# Patient Record
Sex: FEMALE | Race: WHITE | NOT HISPANIC OR LATINO | URBAN - METROPOLITAN AREA
[De-identification: names, ages, dates, MRNs, and addresses within clinical notes are randomized per-mention and may not be internally consistent; named-entity substitution may affect disease eponyms.]

---

## 2017-01-30 ENCOUNTER — OFFICE (AMBULATORY)
Dept: URBAN - METROPOLITAN AREA CLINIC 64 | Facility: CLINIC | Age: 57
End: 2017-01-30

## 2017-01-30 VITALS
WEIGHT: 129 LBS | DIASTOLIC BLOOD PRESSURE: 79 MMHG | SYSTOLIC BLOOD PRESSURE: 143 MMHG | HEART RATE: 82 BPM | HEIGHT: 62 IN

## 2017-01-30 DIAGNOSIS — K59.00 CONSTIPATION, UNSPECIFIED: ICD-10-CM

## 2017-01-30 DIAGNOSIS — K21.9 GASTRO-ESOPHAGEAL REFLUX DISEASE WITHOUT ESOPHAGITIS: ICD-10-CM

## 2017-01-30 DIAGNOSIS — R94.5 ABNORMAL RESULTS OF LIVER FUNCTION STUDIES: ICD-10-CM

## 2017-01-30 PROCEDURE — 99243 OFF/OP CNSLTJ NEW/EST LOW 30: CPT | Performed by: INTERNAL MEDICINE

## 2017-01-30 RX ORDER — PANTOPRAZOLE SODIUM 40 MG/1
40 TABLET, DELAYED RELEASE ORAL
Qty: 90 | Refills: 3 | Status: COMPLETED
Start: 2017-01-30 | End: 2020-10-01

## 2017-01-30 RX ORDER — LINACLOTIDE 290 UG/1
290 CAPSULE, GELATIN COATED ORAL
Qty: 90 | Refills: 3 | Status: COMPLETED
Start: 2017-01-30 | End: 2017-03-13

## 2017-03-13 ENCOUNTER — OFFICE (AMBULATORY)
Dept: URBAN - METROPOLITAN AREA CLINIC 64 | Facility: CLINIC | Age: 57
End: 2017-03-13

## 2017-03-13 VITALS
DIASTOLIC BLOOD PRESSURE: 81 MMHG | SYSTOLIC BLOOD PRESSURE: 142 MMHG | WEIGHT: 132 LBS | HEIGHT: 62 IN | HEART RATE: 104 BPM

## 2017-03-13 DIAGNOSIS — R94.5 ABNORMAL RESULTS OF LIVER FUNCTION STUDIES: ICD-10-CM

## 2017-03-13 DIAGNOSIS — R11.0 NAUSEA: ICD-10-CM

## 2017-03-13 DIAGNOSIS — K59.00 CONSTIPATION, UNSPECIFIED: ICD-10-CM

## 2017-03-13 DIAGNOSIS — K21.9 GASTRO-ESOPHAGEAL REFLUX DISEASE WITHOUT ESOPHAGITIS: ICD-10-CM

## 2017-03-13 LAB
AMYLASE, SERUM: 42 U/L (ref 31–124)
COMP. METABOLIC PANEL (14): A/G RATIO: 1.6 (ref 1.2–2.2)
COMP. METABOLIC PANEL (14): ALBUMIN, SERUM: 4.8 G/DL (ref 3.5–5.5)
COMP. METABOLIC PANEL (14): ALKALINE PHOSPHATASE, S: 137 IU/L — HIGH (ref 39–117)
COMP. METABOLIC PANEL (14): ALT (SGPT): 97 IU/L — HIGH (ref 0–32)
COMP. METABOLIC PANEL (14): AST (SGOT): 133 IU/L — HIGH (ref 0–40)
COMP. METABOLIC PANEL (14): BILIRUBIN, TOTAL: 0.5 MG/DL (ref 0–1.2)
COMP. METABOLIC PANEL (14): BUN/CREATININE RATIO: 11 (ref 9–23)
COMP. METABOLIC PANEL (14): BUN: 8 MG/DL (ref 6–24)
COMP. METABOLIC PANEL (14): CALCIUM, SERUM: 9.8 MG/DL (ref 8.7–10.2)
COMP. METABOLIC PANEL (14): CARBON DIOXIDE, TOTAL: 19 MMOL/L (ref 18–29)
COMP. METABOLIC PANEL (14): CHLORIDE, SERUM: 100 MMOL/L (ref 96–106)
COMP. METABOLIC PANEL (14): CREATININE, SERUM: 0.76 MG/DL (ref 0.57–1)
COMP. METABOLIC PANEL (14): EGFR IF AFRICN AM: 101 ML/MIN/1.73 (ref 59–?)
COMP. METABOLIC PANEL (14): EGFR IF NONAFRICN AM: 87 ML/MIN/1.73 (ref 59–?)
COMP. METABOLIC PANEL (14): GLOBULIN, TOTAL: 3 G/DL (ref 1.5–4.5)
COMP. METABOLIC PANEL (14): GLUCOSE, SERUM: 132 MG/DL — HIGH (ref 65–99)
COMP. METABOLIC PANEL (14): POTASSIUM, SERUM: 4.2 MMOL/L (ref 3.5–5.2)
COMP. METABOLIC PANEL (14): PROTEIN, TOTAL, SERUM: 7.8 G/DL (ref 6–8.5)
COMP. METABOLIC PANEL (14): SODIUM, SERUM: 140 MMOL/L (ref 134–144)
FERRITIN, SERUM: 539 NG/ML — HIGH (ref 15–150)
HERED.HEMOCHROMATOSIS, DNA: HEREDITARY  HEMOCHROMATOSIS: (no result)
IRON AND TIBC: IRON BIND.CAP.(TIBC): 290 UG/DL (ref 250–450)
IRON AND TIBC: IRON SATURATION: 29 % (ref 15–55)
IRON AND TIBC: IRON, SERUM: 85 UG/DL (ref 27–159)
IRON AND TIBC: UIBC: 205 UG/DL (ref 131–425)
LIPASE, SERUM: 38 U/L (ref 0–59)
TSH: 3.01 UIU/ML (ref 0.45–4.5)

## 2017-03-13 PROCEDURE — 99214 OFFICE O/P EST MOD 30 MIN: CPT | Performed by: INTERNAL MEDICINE

## 2017-03-13 RX ORDER — SUCRALFATE 1 G/1
TABLET ORAL
Qty: 90 | Refills: 3 | Status: COMPLETED
Start: 2017-03-13 | End: 2017-09-11

## 2017-03-13 RX ORDER — METOCLOPRAMIDE HYDROCHLORIDE 5 MG/1
10 TABLET ORAL
Qty: 60 | Refills: 1 | Status: COMPLETED
Start: 2017-03-13 | End: 2017-09-11

## 2017-03-17 ENCOUNTER — ON CAMPUS - OUTPATIENT (AMBULATORY)
Dept: URBAN - METROPOLITAN AREA HOSPITAL 2 | Facility: HOSPITAL | Age: 57
End: 2017-03-17

## 2017-03-17 ENCOUNTER — OFFICE (AMBULATORY)
Dept: URBAN - METROPOLITAN AREA CLINIC 64 | Facility: CLINIC | Age: 57
End: 2017-03-17
Payer: COMMERCIAL

## 2017-03-17 VITALS
OXYGEN SATURATION: 98 % | SYSTOLIC BLOOD PRESSURE: 145 MMHG | HEIGHT: 62 IN | HEART RATE: 83 BPM | HEART RATE: 79 BPM | DIASTOLIC BLOOD PRESSURE: 88 MMHG | DIASTOLIC BLOOD PRESSURE: 76 MMHG | SYSTOLIC BLOOD PRESSURE: 150 MMHG | RESPIRATION RATE: 16 BRPM | OXYGEN SATURATION: 99 % | HEART RATE: 95 BPM | SYSTOLIC BLOOD PRESSURE: 132 MMHG | SYSTOLIC BLOOD PRESSURE: 142 MMHG | RESPIRATION RATE: 18 BRPM | OXYGEN SATURATION: 97 % | DIASTOLIC BLOOD PRESSURE: 67 MMHG | DIASTOLIC BLOOD PRESSURE: 75 MMHG | SYSTOLIC BLOOD PRESSURE: 137 MMHG | DIASTOLIC BLOOD PRESSURE: 78 MMHG | HEART RATE: 93 BPM | HEART RATE: 76 BPM | SYSTOLIC BLOOD PRESSURE: 103 MMHG | DIASTOLIC BLOOD PRESSURE: 85 MMHG | SYSTOLIC BLOOD PRESSURE: 122 MMHG | OXYGEN SATURATION: 96 % | SYSTOLIC BLOOD PRESSURE: 136 MMHG | OXYGEN SATURATION: 94 % | WEIGHT: 126 LBS | DIASTOLIC BLOOD PRESSURE: 73 MMHG | TEMPERATURE: 97.4 F | SYSTOLIC BLOOD PRESSURE: 123 MMHG | HEART RATE: 85 BPM | HEART RATE: 84 BPM

## 2017-03-17 DIAGNOSIS — R11.0 NAUSEA: ICD-10-CM

## 2017-03-17 DIAGNOSIS — D12.0 BENIGN NEOPLASM OF CECUM: ICD-10-CM

## 2017-03-17 DIAGNOSIS — K31.9 DISEASE OF STOMACH AND DUODENUM, UNSPECIFIED: ICD-10-CM

## 2017-03-17 DIAGNOSIS — K59.00 CONSTIPATION, UNSPECIFIED: ICD-10-CM

## 2017-03-17 DIAGNOSIS — R19.4 CHANGE IN BOWEL HABIT: ICD-10-CM

## 2017-03-17 DIAGNOSIS — K64.0 FIRST DEGREE HEMORRHOIDS: ICD-10-CM

## 2017-03-17 DIAGNOSIS — K57.30 DIVERTICULOSIS OF LARGE INTESTINE WITHOUT PERFORATION OR ABS: ICD-10-CM

## 2017-03-17 PROBLEM — K31.89 OTHER DISEASES OF STOMACH AND DUODENUM: Status: ACTIVE | Noted: 2017-03-17

## 2017-03-17 PROBLEM — K29.70 GASTRITIS, UNSPECIFIED, WITHOUT BLEEDING: Status: ACTIVE | Noted: 2017-03-17

## 2017-03-17 LAB
GI HISTOLOGY: A. SELECT: (no result)
GI HISTOLOGY: B. UNSPECIFIED: (no result)
GI HISTOLOGY: PDF REPORT: (no result)

## 2017-03-17 PROCEDURE — 88305 TISSUE EXAM BY PATHOLOGIST: CPT | Performed by: INTERNAL MEDICINE

## 2017-03-17 PROCEDURE — 45380 COLONOSCOPY AND BIOPSY: CPT | Performed by: INTERNAL MEDICINE

## 2017-03-17 PROCEDURE — 43239 EGD BIOPSY SINGLE/MULTIPLE: CPT | Performed by: INTERNAL MEDICINE

## 2017-03-17 RX ORDER — SUCRALFATE 1 G/1
TABLET ORAL
Qty: 90 | Refills: 3 | Status: COMPLETED
Start: 2017-03-13 | End: 2017-09-11

## 2017-03-17 RX ADMIN — PROPOFOL: 10 INJECTION, EMULSION INTRAVENOUS at 16:08

## 2017-04-05 ENCOUNTER — OFFICE (AMBULATORY)
Dept: URBAN - METROPOLITAN AREA CLINIC 64 | Facility: CLINIC | Age: 57
End: 2017-04-05

## 2017-04-05 VITALS
HEART RATE: 91 BPM | SYSTOLIC BLOOD PRESSURE: 152 MMHG | DIASTOLIC BLOOD PRESSURE: 96 MMHG | HEIGHT: 62 IN | WEIGHT: 134 LBS

## 2017-04-05 DIAGNOSIS — K76.0 FATTY (CHANGE OF) LIVER, NOT ELSEWHERE CLASSIFIED: ICD-10-CM

## 2017-04-05 DIAGNOSIS — K59.00 CONSTIPATION, UNSPECIFIED: ICD-10-CM

## 2017-04-05 DIAGNOSIS — R94.5 ABNORMAL RESULTS OF LIVER FUNCTION STUDIES: ICD-10-CM

## 2017-04-05 DIAGNOSIS — R11.0 NAUSEA: ICD-10-CM

## 2017-04-05 PROCEDURE — 99213 OFFICE O/P EST LOW 20 MIN: CPT | Performed by: INTERNAL MEDICINE

## 2017-05-16 ENCOUNTER — OFFICE (AMBULATORY)
Dept: URBAN - METROPOLITAN AREA CLINIC 64 | Facility: CLINIC | Age: 57
End: 2017-05-16

## 2017-05-16 VITALS
DIASTOLIC BLOOD PRESSURE: 67 MMHG | SYSTOLIC BLOOD PRESSURE: 114 MMHG | HEIGHT: 62 IN | HEART RATE: 77 BPM | WEIGHT: 128 LBS

## 2017-05-16 DIAGNOSIS — K59.00 CONSTIPATION, UNSPECIFIED: ICD-10-CM

## 2017-05-16 DIAGNOSIS — K76.0 FATTY (CHANGE OF) LIVER, NOT ELSEWHERE CLASSIFIED: ICD-10-CM

## 2017-05-16 DIAGNOSIS — R11.0 NAUSEA: ICD-10-CM

## 2017-05-16 PROCEDURE — 99214 OFFICE O/P EST MOD 30 MIN: CPT | Performed by: INTERNAL MEDICINE

## 2017-05-16 RX ORDER — URSODIOL 500 MG/1
1000 TABLET ORAL
Qty: 60 | Refills: 11 | Status: COMPLETED
Start: 2017-05-16 | End: 2017-09-11

## 2017-09-11 ENCOUNTER — OFFICE (AMBULATORY)
Dept: URBAN - METROPOLITAN AREA CLINIC 64 | Facility: CLINIC | Age: 57
End: 2017-09-11

## 2017-09-11 VITALS
HEIGHT: 62 IN | WEIGHT: 127 LBS | HEART RATE: 75 BPM | DIASTOLIC BLOOD PRESSURE: 75 MMHG | SYSTOLIC BLOOD PRESSURE: 120 MMHG

## 2017-09-11 DIAGNOSIS — R11.0 NAUSEA: ICD-10-CM

## 2017-09-11 DIAGNOSIS — K59.00 CONSTIPATION, UNSPECIFIED: ICD-10-CM

## 2017-09-11 DIAGNOSIS — K76.0 FATTY (CHANGE OF) LIVER, NOT ELSEWHERE CLASSIFIED: ICD-10-CM

## 2017-09-11 LAB
AFP, SERUM, TUMOR MARKER: 6.1 NG/ML (ref 0–8.3)
CBC WITH DIFFERENTIAL/PLATELET: BASO (ABSOLUTE): 0.1 X10E3/UL (ref 0–0.2)
CBC WITH DIFFERENTIAL/PLATELET: BASOS: 1 %
CBC WITH DIFFERENTIAL/PLATELET: EOS (ABSOLUTE): 0.2 X10E3/UL (ref 0–0.4)
CBC WITH DIFFERENTIAL/PLATELET: EOS: 3 %
CBC WITH DIFFERENTIAL/PLATELET: HEMATOCRIT: 45.6 % (ref 34–46.6)
CBC WITH DIFFERENTIAL/PLATELET: HEMATOLOGY COMMENTS: (no result)
CBC WITH DIFFERENTIAL/PLATELET: HEMOGLOBIN: 15.9 G/DL (ref 11.1–15.9)
CBC WITH DIFFERENTIAL/PLATELET: IMMATURE CELLS: (no result)
CBC WITH DIFFERENTIAL/PLATELET: IMMATURE GRANS (ABS): 0 X10E3/UL (ref 0–0.1)
CBC WITH DIFFERENTIAL/PLATELET: IMMATURE GRANULOCYTES: 0 %
CBC WITH DIFFERENTIAL/PLATELET: LYMPHS (ABSOLUTE): 1.8 X10E3/UL (ref 0.7–3.1)
CBC WITH DIFFERENTIAL/PLATELET: LYMPHS: 28 %
CBC WITH DIFFERENTIAL/PLATELET: MCH: 31.8 PG (ref 26.6–33)
CBC WITH DIFFERENTIAL/PLATELET: MCHC: 34.9 G/DL (ref 31.5–35.7)
CBC WITH DIFFERENTIAL/PLATELET: MCV: 91 FL (ref 79–97)
CBC WITH DIFFERENTIAL/PLATELET: MONOCYTES(ABSOLUTE): 0.5 X10E3/UL (ref 0.1–0.9)
CBC WITH DIFFERENTIAL/PLATELET: MONOCYTES: 7 %
CBC WITH DIFFERENTIAL/PLATELET: NEUTROPHILS (ABSOLUTE): 3.9 X10E3/UL (ref 1.4–7)
CBC WITH DIFFERENTIAL/PLATELET: NEUTROPHILS: 61 %
CBC WITH DIFFERENTIAL/PLATELET: NRBC: (no result)
CBC WITH DIFFERENTIAL/PLATELET: PLATELETS: 158 X10E3/UL (ref 150–379)
CBC WITH DIFFERENTIAL/PLATELET: RBC: 5 X10E6/UL (ref 3.77–5.28)
CBC WITH DIFFERENTIAL/PLATELET: RDW: 12.8 % (ref 12.3–15.4)
CBC WITH DIFFERENTIAL/PLATELET: WBC: 6.5 X10E3/UL (ref 3.4–10.8)
COMP. METABOLIC PANEL (14): A/G RATIO: 1.5 (ref 1.2–2.2)
COMP. METABOLIC PANEL (14): ALBUMIN, SERUM: 4.7 G/DL (ref 3.5–5.5)
COMP. METABOLIC PANEL (14): ALKALINE PHOSPHATASE, S: 155 IU/L — HIGH (ref 39–117)
COMP. METABOLIC PANEL (14): ALT (SGPT): 70 IU/L — HIGH (ref 0–32)
COMP. METABOLIC PANEL (14): AST (SGOT): 82 IU/L — HIGH (ref 0–40)
COMP. METABOLIC PANEL (14): BILIRUBIN, TOTAL: 0.6 MG/DL (ref 0–1.2)
COMP. METABOLIC PANEL (14): BUN/CREATININE RATIO: 13 (ref 9–23)
COMP. METABOLIC PANEL (14): BUN: 8 MG/DL (ref 6–24)
COMP. METABOLIC PANEL (14): CALCIUM, SERUM: 9.6 MG/DL (ref 8.7–10.2)
COMP. METABOLIC PANEL (14): CARBON DIOXIDE, TOTAL: 21 MMOL/L (ref 18–29)
COMP. METABOLIC PANEL (14): CHLORIDE, SERUM: 96 MMOL/L (ref 96–106)
COMP. METABOLIC PANEL (14): CREATININE, SERUM: 0.62 MG/DL (ref 0.57–1)
COMP. METABOLIC PANEL (14): EGFR IF AFRICN AM: 115 ML/MIN/1.73 (ref 59–?)
COMP. METABOLIC PANEL (14): EGFR IF NONAFRICN AM: 100 ML/MIN/1.73 (ref 59–?)
COMP. METABOLIC PANEL (14): GLOBULIN, TOTAL: 3.2 G/DL (ref 1.5–4.5)
COMP. METABOLIC PANEL (14): GLUCOSE, SERUM: 95 MG/DL (ref 65–99)
COMP. METABOLIC PANEL (14): POTASSIUM, SERUM: 4 MMOL/L (ref 3.5–5.2)
COMP. METABOLIC PANEL (14): PROTEIN, TOTAL, SERUM: 7.9 G/DL (ref 6–8.5)
COMP. METABOLIC PANEL (14): SODIUM, SERUM: 137 MMOL/L (ref 134–144)
FERRITIN, SERUM: 402 NG/ML — HIGH (ref 15–150)

## 2017-09-11 PROCEDURE — 99214 OFFICE O/P EST MOD 30 MIN: CPT | Performed by: INTERNAL MEDICINE

## 2018-08-21 ENCOUNTER — OFFICE (AMBULATORY)
Dept: URBAN - METROPOLITAN AREA CLINIC 64 | Facility: CLINIC | Age: 58
End: 2018-08-21

## 2018-08-21 VITALS
HEIGHT: 62 IN | SYSTOLIC BLOOD PRESSURE: 148 MMHG | DIASTOLIC BLOOD PRESSURE: 77 MMHG | HEART RATE: 88 BPM | WEIGHT: 126 LBS

## 2018-08-21 DIAGNOSIS — R11.0 NAUSEA: ICD-10-CM

## 2018-08-21 DIAGNOSIS — K59.00 CONSTIPATION, UNSPECIFIED: ICD-10-CM

## 2018-08-21 DIAGNOSIS — R94.5 ABNORMAL RESULTS OF LIVER FUNCTION STUDIES: ICD-10-CM

## 2018-08-21 PROCEDURE — 99213 OFFICE O/P EST LOW 20 MIN: CPT | Performed by: INTERNAL MEDICINE

## 2020-10-01 ENCOUNTER — OFFICE (AMBULATORY)
Dept: URBAN - METROPOLITAN AREA CLINIC 64 | Facility: CLINIC | Age: 60
End: 2020-10-01

## 2020-10-01 VITALS
WEIGHT: 123 LBS | HEART RATE: 79 BPM | DIASTOLIC BLOOD PRESSURE: 88 MMHG | SYSTOLIC BLOOD PRESSURE: 170 MMHG | HEIGHT: 62 IN

## 2020-10-01 DIAGNOSIS — R11.0 NAUSEA: ICD-10-CM

## 2020-10-01 DIAGNOSIS — K75.81 NONALCOHOLIC STEATOHEPATITIS (NASH): ICD-10-CM

## 2020-10-01 DIAGNOSIS — K59.00 CONSTIPATION, UNSPECIFIED: ICD-10-CM

## 2020-10-01 PROCEDURE — 99214 OFFICE O/P EST MOD 30 MIN: CPT | Performed by: INTERNAL MEDICINE

## 2020-10-01 RX ORDER — NYSTATIN 100000 [USP'U]/ML
SUSPENSION ORAL
Qty: 600 | Refills: 1 | Status: COMPLETED
Start: 2020-10-01 | End: 2022-01-26

## 2020-10-01 RX ORDER — OMEPRAZOLE 20 MG/1
CAPSULE, DELAYED RELEASE ORAL
Qty: 0 | Refills: 0 | Status: COMPLETED
End: 2022-01-26

## 2021-06-18 ENCOUNTER — TRANSCRIBE ORDERS (OUTPATIENT)
Dept: PULMONOLOGY | Facility: HOSPITAL | Age: 61
End: 2021-06-18

## 2021-06-18 DIAGNOSIS — Z13.6 ENCOUNTER FOR SCREENING FOR VASCULAR DISEASE: Primary | ICD-10-CM

## 2021-06-25 ENCOUNTER — HOSPITAL ENCOUNTER (OUTPATIENT)
Dept: CARDIOLOGY | Facility: HOSPITAL | Age: 61
Discharge: HOME | End: 2021-06-25

## 2021-06-25 ENCOUNTER — HOSPITAL ENCOUNTER (OUTPATIENT)
Dept: CT IMAGING | Facility: HOSPITAL | Age: 61
Discharge: HOME | End: 2021-06-25

## 2021-06-25 DIAGNOSIS — Z13.6 ENCOUNTER FOR SCREENING FOR VASCULAR DISEASE: ICD-10-CM

## 2021-06-25 PROCEDURE — 93799 UNLISTED CV SVC/PROCEDURE: CPT

## 2021-06-25 PROCEDURE — 75571 CT HRT W/O DYE W/CA TEST: CPT

## 2021-06-26 LAB
BH CV XLRA MEAS - PAD LEFT ABI PT: 1.06
BH CV XLRA MEAS - PAD LEFT ARM: 156 MMHG
BH CV XLRA MEAS - PAD LEFT LEG PT: 165 MMHG
BH CV XLRA MEAS - PAD RIGHT ABI PT: 1.04
BH CV XLRA MEAS - PAD RIGHT LEG PT: 163 MMHG
BH CV XLRA MEAS - PROX AO DIAM: 1.9 CM
BH CV XLRA MEAS LEFT DIST CCA PSV: 89.4 CM/SEC
BH CV XLRA MEAS LEFT ICA/CCA RATIO: 1.6
BH CV XLRA MEAS LEFT MID CCA PSV: 89 CM/SEC
BH CV XLRA MEAS LEFT MID ICA PSV: 140 CM/SEC
BH CV XLRA MEAS LEFT PROX ICA PSV: -143 CM/SEC
BH CV XLRA MEAS RIGHT DIST CCA PSV: 79.5 CM/SEC
BH CV XLRA MEAS RIGHT ICA/CCA RATIO: 1.9
BH CV XLRA MEAS RIGHT MID CCA PSV: 80 CM/SEC
BH CV XLRA MEAS RIGHT MID ICA PSV: 149 CM/SEC
BH CV XLRA MEAS RIGHT PROX ICA PSV: -149 CM/SEC
MAXIMAL PREDICTED HEART RATE: 159 BPM
STRESS TARGET HR: 135 BPM

## 2022-01-26 ENCOUNTER — OFFICE (AMBULATORY)
Dept: URBAN - METROPOLITAN AREA CLINIC 64 | Facility: CLINIC | Age: 62
End: 2022-01-26

## 2022-01-26 VITALS
WEIGHT: 124 LBS | DIASTOLIC BLOOD PRESSURE: 87 MMHG | HEIGHT: 62 IN | SYSTOLIC BLOOD PRESSURE: 169 MMHG | HEART RATE: 85 BPM

## 2022-01-26 DIAGNOSIS — K75.81 NONALCOHOLIC STEATOHEPATITIS (NASH): ICD-10-CM

## 2022-01-26 DIAGNOSIS — R10.9 UNSPECIFIED ABDOMINAL PAIN: ICD-10-CM

## 2022-01-26 PROBLEM — R11.0 NAUSEA: Status: ACTIVE | Noted: 2017-03-17

## 2022-01-26 PROCEDURE — 99214 OFFICE O/P EST MOD 30 MIN: CPT | Performed by: INTERNAL MEDICINE

## 2022-02-25 ENCOUNTER — OFFICE (AMBULATORY)
Dept: URBAN - METROPOLITAN AREA PATHOLOGY 4 | Facility: PATHOLOGY | Age: 62
End: 2022-02-25

## 2022-02-25 ENCOUNTER — ON CAMPUS - OUTPATIENT (AMBULATORY)
Dept: URBAN - METROPOLITAN AREA HOSPITAL 2 | Facility: HOSPITAL | Age: 62
End: 2022-02-25

## 2022-02-25 VITALS
DIASTOLIC BLOOD PRESSURE: 63 MMHG | DIASTOLIC BLOOD PRESSURE: 91 MMHG | HEART RATE: 78 BPM | DIASTOLIC BLOOD PRESSURE: 74 MMHG | TEMPERATURE: 97.5 F | DIASTOLIC BLOOD PRESSURE: 78 MMHG | OXYGEN SATURATION: 98 % | SYSTOLIC BLOOD PRESSURE: 130 MMHG | DIASTOLIC BLOOD PRESSURE: 92 MMHG | OXYGEN SATURATION: 99 % | RESPIRATION RATE: 16 BRPM | HEART RATE: 85 BPM | HEART RATE: 80 BPM | DIASTOLIC BLOOD PRESSURE: 75 MMHG | SYSTOLIC BLOOD PRESSURE: 148 MMHG | SYSTOLIC BLOOD PRESSURE: 170 MMHG | DIASTOLIC BLOOD PRESSURE: 81 MMHG | SYSTOLIC BLOOD PRESSURE: 146 MMHG | OXYGEN SATURATION: 95 % | SYSTOLIC BLOOD PRESSURE: 163 MMHG | WEIGHT: 124 LBS | RESPIRATION RATE: 18 BRPM | SYSTOLIC BLOOD PRESSURE: 153 MMHG | DIASTOLIC BLOOD PRESSURE: 72 MMHG | SYSTOLIC BLOOD PRESSURE: 131 MMHG | SYSTOLIC BLOOD PRESSURE: 135 MMHG | DIASTOLIC BLOOD PRESSURE: 93 MMHG | OXYGEN SATURATION: 100 % | HEIGHT: 62 IN | HEART RATE: 79 BPM

## 2022-02-25 DIAGNOSIS — K31.89 OTHER DISEASES OF STOMACH AND DUODENUM: ICD-10-CM

## 2022-02-25 DIAGNOSIS — K62.1 RECTAL POLYP: ICD-10-CM

## 2022-02-25 DIAGNOSIS — K64.1 SECOND DEGREE HEMORRHOIDS: ICD-10-CM

## 2022-02-25 DIAGNOSIS — R19.4 CHANGE IN BOWEL HABIT: ICD-10-CM

## 2022-02-25 DIAGNOSIS — K21.9 GASTRO-ESOPHAGEAL REFLUX DISEASE WITHOUT ESOPHAGITIS: ICD-10-CM

## 2022-02-25 PROCEDURE — 43239 EGD BIOPSY SINGLE/MULTIPLE: CPT | Performed by: INTERNAL MEDICINE

## 2022-02-25 PROCEDURE — 88305 TISSUE EXAM BY PATHOLOGIST: CPT | Performed by: INTERNAL MEDICINE

## 2022-02-25 PROCEDURE — 45380 COLONOSCOPY AND BIOPSY: CPT | Performed by: INTERNAL MEDICINE

## 2022-04-15 ENCOUNTER — OFFICE VISIT (OUTPATIENT)
Dept: FAMILY MEDICINE CLINIC | Facility: CLINIC | Age: 62
End: 2022-04-15

## 2022-04-15 VITALS
SYSTOLIC BLOOD PRESSURE: 174 MMHG | HEIGHT: 62 IN | BODY MASS INDEX: 22.93 KG/M2 | OXYGEN SATURATION: 98 % | HEART RATE: 95 BPM | RESPIRATION RATE: 14 BRPM | TEMPERATURE: 98 F | WEIGHT: 124.6 LBS | DIASTOLIC BLOOD PRESSURE: 80 MMHG

## 2022-04-15 DIAGNOSIS — Z86.19 H/O LYME DISEASE: ICD-10-CM

## 2022-04-15 DIAGNOSIS — H53.9 VISUAL CHANGES: ICD-10-CM

## 2022-04-15 DIAGNOSIS — Z76.89 ENCOUNTER TO ESTABLISH CARE WITH NEW DOCTOR: Primary | ICD-10-CM

## 2022-04-15 DIAGNOSIS — I10 ESSENTIAL HYPERTENSION: ICD-10-CM

## 2022-04-15 DIAGNOSIS — F41.9 ANXIETY: ICD-10-CM

## 2022-04-15 DIAGNOSIS — Z13.220 SCREENING FOR CHOLESTEROL LEVEL: ICD-10-CM

## 2022-04-15 DIAGNOSIS — R04.0 EPISTAXIS: ICD-10-CM

## 2022-04-15 DIAGNOSIS — A49.02 MRSA (METHICILLIN RESISTANT STAPHYLOCOCCUS AUREUS) INFECTION: ICD-10-CM

## 2022-04-15 DIAGNOSIS — Z85.3 HISTORY OF BREAST CANCER IN FEMALE: ICD-10-CM

## 2022-04-15 PROBLEM — D69.6 THROMBOCYTOPENIA (HCC): Status: ACTIVE | Noted: 2021-08-24

## 2022-04-15 PROBLEM — M35.00 SJOGREN'S DISEASE: Status: ACTIVE | Noted: 2021-08-25

## 2022-04-15 PROBLEM — K74.60 CIRRHOSIS (HCC): Status: ACTIVE | Noted: 2021-08-25

## 2022-04-15 PROBLEM — K75.81 NASH (NONALCOHOLIC STEATOHEPATITIS): Status: ACTIVE | Noted: 2021-08-25

## 2022-04-15 PROBLEM — D69.6 THROMBOCYTOPENIA: Status: RESOLVED | Noted: 2021-08-24 | Resolved: 2022-04-15

## 2022-04-15 PROBLEM — K75.81 NASH (NONALCOHOLIC STEATOHEPATITIS): Status: RESOLVED | Noted: 2021-08-25 | Resolved: 2022-04-15

## 2022-04-15 PROCEDURE — 99214 OFFICE O/P EST MOD 30 MIN: CPT | Performed by: FAMILY MEDICINE

## 2022-04-15 RX ORDER — CLOBETASOL PROPIONATE 0.5 MG/G
CREAM TOPICAL
COMMUNITY
Start: 2022-03-10

## 2022-04-15 RX ORDER — LISINOPRIL 10 MG/1
10 TABLET ORAL DAILY
Qty: 30 TABLET | Refills: 0 | Status: SHIPPED | OUTPATIENT
Start: 2022-04-15 | End: 2022-05-05

## 2022-04-15 NOTE — PROGRESS NOTES
Chief Complaint  Establish Care, Hypertension, Anxiety, Nose Bleed, and Headache  History of Present Illness  Meeta Daley is here to establish care.  She reports multiple concerns including visual changes, nosebleeds, migraines, anxiety, history of Lyme's disease.    Patient has been having problems with her blood pressure and believes that it has been affecting her eye sight, causing nose bleeds and migraines. Patient is not taking any blood pressure medicine at this time.  She brings in home blood pressure readings over the past week she has been checking at least twice on most days 5-9 times daily, blood pressure range 124//97.  Heart rate , oxygen 95 to 97%     Patient reports she has really bad anxiety. Patient states she got bit by a tick 2 years ago, was diagnosed with lyme disease.  Patient states she had lymes for 11 months before she received treatment. Have sent been treated with doxycycline for lymes    Vision concern, Vision trouble started on 4/7.  Reports vision went dark except a spot in the center of vision.  Have seen Dr Courtney multiple tests, told eyes are healthy    On 4/10/22 reports her watch told her in afib 3 times, not before or since.     States she believes she had heart attack on June 25 2021   multiple health screening at [facility] on 6/25/2021.  Findings include:  There was mild plaque involving bilateral internal carotid arteries without any hemodynamically significant stenosis otherwise normal vascular ultrasound study.  T score at heel was -2.1  CT calcium score was 0.1.    Reports history of thrombocytosis , W/U with hematology reportedly negative.  Platelets on 8/9/2021 low 190    Colonoscopy and EGD 2/25/2022, Dr. Mcmillan, showed erosive gastritis.    Mammogram 9/9/2021 BI-RADS 1    She also reports personal history of right-sided estrogen receptor positive breast cancer in 2006 and history of MRSA colonization      Current Outpatient Medications on File Prior  "to Visit   Medication Sig   • clobetasol (TEMOVATE) 0.05 % cream APPLY TOPICALLY TO THE AFFECTED AREA TWICE DAILY AS NEEDED FOR ITCHING   • mupirocin (BACTROBAN) 2 % nasal ointment into the nostril(s) as directed by provider 2 (Two) Times a Day.     No current facility-administered medications on file prior to visit.       Objective   Vital Signs:   /80   Pulse 95   Temp 98 °F (36.7 °C)   Resp 14   Ht 157.5 cm (62.01\")   Wt 56.5 kg (124 lb 9.6 oz)   SpO2 98%   BMI 22.78 kg/m²       Physical Exam  Vitals and nursing note reviewed.   Constitutional:       General: She is not in acute distress.     Appearance: She is well-developed.   HENT:      Head: Normocephalic and atraumatic.   Cardiovascular:      Rate and Rhythm: Normal rate and regular rhythm.      Heart sounds: No murmur heard.  Pulmonary:      Effort: Pulmonary effort is normal.      Breath sounds: Normal breath sounds. No wheezing.   Musculoskeletal:         General: Normal range of motion.   Skin:     General: Skin is warm and dry.      Findings: No rash.   Neurological:      Mental Status: She is alert and oriented to person, place, and time.   Psychiatric:      Comments: Anxious, pressured, pressured            Office Visit on 04/15/2022   Component Date Value Ref Range Status   • Glucose 04/19/2022 93  65 - 99 mg/dL Final   • BUN 04/19/2022 11  8 - 27 mg/dL Final   • Creatinine 04/19/2022 0.70  0.57 - 1.00 mg/dL Final   • EGFR Result 04/19/2022 98  >59 mL/min/1.73 Final   • BUN/Creatinine Ratio 04/19/2022 16  12 - 28 Final   • Sodium 04/19/2022 141  134 - 144 mmol/L Final   • Potassium 04/19/2022 4.7  3.5 - 5.2 mmol/L Final   • Chloride 04/19/2022 102  96 - 106 mmol/L Final   • Total CO2 04/19/2022 22  20 - 29 mmol/L Final   • Calcium 04/19/2022 9.7  8.7 - 10.3 mg/dL Final   • Total Protein 04/19/2022 7.8  6.0 - 8.5 g/dL Final   • Albumin 04/19/2022 4.6  3.8 - 4.8 g/dL Final   • Globulin 04/19/2022 3.2  1.5 - 4.5 g/dL Final   • A/G Ratio " 04/19/2022 1.4  1.2 - 2.2 Final   • Total Bilirubin 04/19/2022 0.5  0.0 - 1.2 mg/dL Final   • Alkaline Phosphatase 04/19/2022 96  44 - 121 IU/L Final   • AST (SGOT) 04/19/2022 35  0 - 40 IU/L Final   • ALT (SGPT) 04/19/2022 27  0 - 32 IU/L Final   • Total Cholesterol 04/19/2022 186  100 - 199 mg/dL Final   • Triglycerides 04/19/2022 238 (A) 0 - 149 mg/dL Final   • HDL Cholesterol 04/19/2022 32 (A) >39 mg/dL Final   • VLDL Cholesterol Ed 04/19/2022 42 (A) 5 - 40 mg/dL Final   • LDL Chol Calc ([facility]) 04/19/2022 112 (A) 0 - 99 mg/dL Final   • WBC 04/19/2022 4.9  3.4 - 10.8 x10E3/uL Final    **Verified by repeat analysis**   • RBC 04/19/2022 4.87  3.77 - 5.28 x10E6/uL Final   • Hemoglobin 04/19/2022 15.4  11.1 - 15.9 g/dL Final   • Hematocrit 04/19/2022 45.0  34.0 - 46.6 % Final   • MCV 04/19/2022 92  79 - 97 fL Final   • MCH 04/19/2022 31.6  26.6 - 33.0 pg Final   • MCHC 04/19/2022 34.2  31.5 - 35.7 g/dL Final   • RDW 04/19/2022 13.1  11.7 - 15.4 % Final   • Platelets 04/19/2022 143 (A) 150 - 450 x10E3/uL Final   • Neutrophil Rel % 04/19/2022 55  Not Estab. % Final   • Lymphocyte Rel % 04/19/2022 32  Not Estab. % Final   • Monocyte Rel % 04/19/2022 9  Not Estab. % Final   • Eosinophil Rel % 04/19/2022 3  Not Estab. % Final   • Basophil Rel % 04/19/2022 1  Not Estab. % Final   • Neutrophils Absolute 04/19/2022 2.7  1.4 - 7.0 x10E3/uL Final   • Lymphocytes Absolute 04/19/2022 1.6  0.7 - 3.1 x10E3/uL Final   • Monocytes Absolute 04/19/2022 0.4  0.1 - 0.9 x10E3/uL Final   • Eosinophils Absolute 04/19/2022 0.2  0.0 - 0.4 x10E3/uL Final   • Basophils Absolute 04/19/2022 0.0  0.0 - 0.2 x10E3/uL Final   • Immature Granulocyte Rel % 04/19/2022 0  Not Estab. % Final   • Immature Grans Absolute 04/19/2022 0.0  0.0 - 0.1 x10E3/uL Final   • TSH 04/19/2022 3.610  0.450 - 4.500 uIU/mL Final   • Free T4 04/19/2022 1.10  0.82 - 1.77 ng/dL Final   • PTH, Intact 04/19/2022 14 (A) 15 - 65 pg/mL Final       Lab Results   Component Value  Date    BUN 11 04/19/2022    CREATININE 0.70 04/19/2022     04/19/2022    K 4.7 04/19/2022     04/19/2022    CALCIUM 9.7 04/19/2022    ALBUMIN 4.6 04/19/2022    BILITOT 0.5 04/19/2022    ALKPHOS 96 04/19/2022    AST 35 04/19/2022    ALT 27 04/19/2022    CHLPL 186 04/19/2022    TRIG 238 (H) 04/19/2022    HDL 32 (L) 04/19/2022    VLDL 42 (H) 04/19/2022     (H) 04/19/2022    WBC 4.9 04/19/2022    RBC 4.87 04/19/2022    HCT 45.0 04/19/2022    MCV 92 04/19/2022    MCH 31.6 04/19/2022    TSH 3.610 04/19/2022    FREET4 1.10 04/19/2022        No results found for: HGBA1C             Assessment and Plan    Diagnoses and all orders for this visit:    1. Encounter to establish care with new doctor (Primary)    2. Essential hypertension  -     lisinopril (PRINIVIL,ZESTRIL) 10 MG tablet; Take 1 tablet by mouth Daily.  Dispense: 30 tablet; Refill: 0  -     Comprehensive Metabolic Panel  -     CBC & Differential  -     TSH  -     T4, Free  -     PTH, Intact    3. MRSA (methicillin resistant Staphylococcus aureus) infection    4. Epistaxis    5. Visual changes  -     PTH, Intact    6. Screening for cholesterol level  -     Comprehensive Metabolic Panel  -     Lipid Panel    7. H/o Lyme disease  -     CBC & Differential    8. History of breast cancer in female  Comments:  right estrogen receptor positive 2006    9. Anxiety    Other orders  -     aspirin  MG tablet; Take 1 tablet by mouth Daily.  Dispense: 30 tablet; Refill: 12      New diagnosis of hypertension, starting lisinopril 10 mg    Reported atrial fibrillation on home watch, patient refuses EKG or additional cardiac evaluation at this time.  Stressed that if she is having paroxysmal atrial fibrillation she could be having TIAs/mini stroke and this does need to be evaluated, preferably with a 30-day event recorder and/or referral to cardiology for possible consideration of loop recorder.  Again she declined any work-up at this time.  Discussed  starting beta-blocker instead of ACE inhibitor as above, patient was not comfortable with the potential side effect of fatigue.  She will start an aspirin at this time    Anxiety, patient reports multiple antidepressant/anxiety treatments in the past caused her much more side effect than benefit and does not want to discuss starting SSRI or other treatment at this time.    There are no discontinued medications.    I spent over 50 minutes caring for Meeta on this date of service. This time includes time spent by me in the following activities:preparing for the visit, reviewing tests, obtaining and/or reviewing a separately obtained history, performing a medically appropriate examination and/or evaluation , counseling and educating the patient/family/caregiver, ordering medications, tests, or procedures and documenting information in the medical record  Follow Up     Return in about 2 weeks (around 4/29/2022) for Recheck, htn, anxiety, review labs.    Patient was given instructions and counseling regarding her condition or for health maintenance advice. Please see specific information pulled into the AVS if appropriate.

## 2022-04-18 RX ORDER — LISINOPRIL 10 MG/1
10 TABLET ORAL DAILY
Qty: 90 TABLET | OUTPATIENT
Start: 2022-04-18

## 2022-04-20 ENCOUNTER — PATIENT ROUNDING (BHMG ONLY) (OUTPATIENT)
Dept: FAMILY MEDICINE CLINIC | Facility: CLINIC | Age: 62
End: 2022-04-20

## 2022-04-20 LAB
ALBUMIN SERPL-MCNC: 4.6 G/DL (ref 3.8–4.8)
ALBUMIN/GLOB SERPL: 1.4 {RATIO} (ref 1.2–2.2)
ALP SERPL-CCNC: 96 IU/L (ref 44–121)
ALT SERPL-CCNC: 27 IU/L (ref 0–32)
AST SERPL-CCNC: 35 IU/L (ref 0–40)
BASOPHILS # BLD AUTO: 0 X10E3/UL (ref 0–0.2)
BASOPHILS NFR BLD AUTO: 1 %
BILIRUB SERPL-MCNC: 0.5 MG/DL (ref 0–1.2)
BUN SERPL-MCNC: 11 MG/DL (ref 8–27)
BUN/CREAT SERPL: 16 (ref 12–28)
CALCIUM SERPL-MCNC: 9.7 MG/DL (ref 8.7–10.3)
CHLORIDE SERPL-SCNC: 102 MMOL/L (ref 96–106)
CHOLEST SERPL-MCNC: 186 MG/DL (ref 100–199)
CO2 SERPL-SCNC: 22 MMOL/L (ref 20–29)
CREAT SERPL-MCNC: 0.7 MG/DL (ref 0.57–1)
EGFRCR SERPLBLD CKD-EPI 2021: 98 ML/MIN/1.73
EOSINOPHIL # BLD AUTO: 0.2 X10E3/UL (ref 0–0.4)
EOSINOPHIL NFR BLD AUTO: 3 %
ERYTHROCYTE [DISTWIDTH] IN BLOOD BY AUTOMATED COUNT: 13.1 % (ref 11.7–15.4)
GLOBULIN SER CALC-MCNC: 3.2 G/DL (ref 1.5–4.5)
GLUCOSE SERPL-MCNC: 93 MG/DL (ref 65–99)
HCT VFR BLD AUTO: 45 % (ref 34–46.6)
HDLC SERPL-MCNC: 32 MG/DL
HGB BLD-MCNC: 15.4 G/DL (ref 11.1–15.9)
IMM GRANULOCYTES # BLD AUTO: 0 X10E3/UL (ref 0–0.1)
IMM GRANULOCYTES NFR BLD AUTO: 0 %
LDLC SERPL CALC-MCNC: 112 MG/DL (ref 0–99)
LYMPHOCYTES # BLD AUTO: 1.6 X10E3/UL (ref 0.7–3.1)
LYMPHOCYTES NFR BLD AUTO: 32 %
MCH RBC QN AUTO: 31.6 PG (ref 26.6–33)
MCHC RBC AUTO-ENTMCNC: 34.2 G/DL (ref 31.5–35.7)
MCV RBC AUTO: 92 FL (ref 79–97)
MONOCYTES # BLD AUTO: 0.4 X10E3/UL (ref 0.1–0.9)
MONOCYTES NFR BLD AUTO: 9 %
NEUTROPHILS # BLD AUTO: 2.7 X10E3/UL (ref 1.4–7)
NEUTROPHILS NFR BLD AUTO: 55 %
PLATELET # BLD AUTO: 143 X10E3/UL (ref 150–450)
POTASSIUM SERPL-SCNC: 4.7 MMOL/L (ref 3.5–5.2)
PROT SERPL-MCNC: 7.8 G/DL (ref 6–8.5)
PTH-INTACT SERPL-MCNC: 14 PG/ML (ref 15–65)
RBC # BLD AUTO: 4.87 X10E6/UL (ref 3.77–5.28)
SODIUM SERPL-SCNC: 141 MMOL/L (ref 134–144)
T4 FREE SERPL-MCNC: 1.1 NG/DL (ref 0.82–1.77)
TRIGL SERPL-MCNC: 238 MG/DL (ref 0–149)
TSH SERPL DL<=0.005 MIU/L-ACNC: 3.61 UIU/ML (ref 0.45–4.5)
VLDLC SERPL CALC-MCNC: 42 MG/DL (ref 5–40)
WBC # BLD AUTO: 4.9 X10E3/UL (ref 3.4–10.8)

## 2022-04-20 NOTE — PROGRESS NOTES
April 20, 2022    Hello, may I speak with Meeta Daley?    My name is Kylie Molina.      I am  with [facility]  [address] [place] [address].    Before we get started may I verify your date of birth? 1960    I am calling to officially welcome you to our practice and ask about your recent visit. Is this a good time to talk? yes    Tell me about your visit with us. What things went well?  Very pleased with visit. Everything went well.       We're always looking for ways to make our patients' experiences even better. Do you have recommendations on ways we may improve?  no    Overall were you satisfied with your first visit to our practice? yes       I appreciate you taking the time to speak with me today. Is there anything else I can do for you? no      Thank you, and have a great day.

## 2022-04-24 RX ORDER — ASPIRIN 325 MG
325 TABLET, DELAYED RELEASE (ENTERIC COATED) ORAL DAILY
Qty: 30 TABLET | Refills: 12
Start: 2022-04-24 | End: 2022-05-24 | Stop reason: DRUGHIGH

## 2022-04-27 ENCOUNTER — OFFICE VISIT (OUTPATIENT)
Dept: FAMILY MEDICINE CLINIC | Facility: CLINIC | Age: 62
End: 2022-04-27

## 2022-04-27 ENCOUNTER — HOSPITAL ENCOUNTER (OUTPATIENT)
Dept: CARDIOLOGY | Facility: HOSPITAL | Age: 62
Discharge: HOME | End: 2022-04-27
Admitting: FAMILY MEDICINE

## 2022-04-27 VITALS
DIASTOLIC BLOOD PRESSURE: 80 MMHG | HEIGHT: 62 IN | BODY MASS INDEX: 22.71 KG/M2 | RESPIRATION RATE: 14 BRPM | OXYGEN SATURATION: 96 % | HEART RATE: 91 BPM | WEIGHT: 123.4 LBS | SYSTOLIC BLOOD PRESSURE: 136 MMHG | TEMPERATURE: 97.8 F

## 2022-04-27 DIAGNOSIS — J30.9 ALLERGIC RHINITIS, UNSPECIFIED SEASONALITY, UNSPECIFIED TRIGGER: ICD-10-CM

## 2022-04-27 DIAGNOSIS — E78.2 MIXED HYPERLIPIDEMIA: ICD-10-CM

## 2022-04-27 DIAGNOSIS — R00.2 PALPITATIONS: ICD-10-CM

## 2022-04-27 DIAGNOSIS — G45.3 AMAUROSIS FUGAX: ICD-10-CM

## 2022-04-27 DIAGNOSIS — R76.11 HISTORY OF POSITIVE PPD, TREATMENT STATUS UNKNOWN: ICD-10-CM

## 2022-04-27 DIAGNOSIS — R05.9 COUGH: ICD-10-CM

## 2022-04-27 DIAGNOSIS — R06.00 DYSPNEA, UNSPECIFIED TYPE: ICD-10-CM

## 2022-04-27 DIAGNOSIS — D69.6 THROMBOCYTOPENIA: ICD-10-CM

## 2022-04-27 DIAGNOSIS — E55.9 VITAMIN D DEFICIENCY: ICD-10-CM

## 2022-04-27 DIAGNOSIS — I10 ESSENTIAL HYPERTENSION: Primary | ICD-10-CM

## 2022-04-27 DIAGNOSIS — R79.89 LOW SERUM PARATHYROID HORMONE (PTH): ICD-10-CM

## 2022-04-27 DIAGNOSIS — A49.02 MRSA (METHICILLIN RESISTANT STAPHYLOCOCCUS AUREUS) INFECTION: ICD-10-CM

## 2022-04-27 DIAGNOSIS — F17.210 CIGARETTE SMOKER: ICD-10-CM

## 2022-04-27 DIAGNOSIS — R25.2 LEG CRAMPS: ICD-10-CM

## 2022-04-27 DIAGNOSIS — F41.9 ANXIETY: ICD-10-CM

## 2022-04-27 PROCEDURE — 93000 ELECTROCARDIOGRAM COMPLETE: CPT | Performed by: FAMILY MEDICINE

## 2022-04-27 PROCEDURE — 93242 EXT ECG>48HR<7D RECORDING: CPT

## 2022-04-27 PROCEDURE — 99215 OFFICE O/P EST HI 40 MIN: CPT | Performed by: FAMILY MEDICINE

## 2022-04-27 RX ORDER — TRIAMCINOLONE ACETONIDE 55 UG/1
2 SPRAY, METERED NASAL DAILY
Qty: 16.5 G | Refills: 11
Start: 2022-04-27 | End: 2023-04-27

## 2022-04-27 RX ORDER — FEXOFENADINE HCL 180 MG/1
180 TABLET ORAL DAILY
Qty: 30 TABLET | Refills: 12
Start: 2022-04-27

## 2022-04-27 RX ORDER — ROSUVASTATIN CALCIUM 5 MG/1
5 TABLET, COATED ORAL DAILY
Qty: 90 TABLET | Refills: 3 | Status: SHIPPED | OUTPATIENT
Start: 2022-04-27

## 2022-04-27 RX ORDER — ROSUVASTATIN CALCIUM 5 MG/1
5 TABLET, COATED ORAL DAILY
Qty: 30 TABLET | Refills: 3 | Status: SHIPPED | OUTPATIENT
Start: 2022-04-27 | End: 2022-04-27

## 2022-04-27 NOTE — PROGRESS NOTES
"Chief Complaint  Hypertension, Anxiety, and Nose Bleed  History of Present Illness    Meeta Daley presents today for for Recheck, htn, anxiety, review labs.    2 weeks ago she was seen and blood pressure was elevated, started on lisinopril, she states 4 days after starting lisinopril she started having symptoms of sore throat, right ear pressure, mucus and dizzy like symptoms. Not sure if it's from medication or allergies.   Pt states the last few mornings her blood pressure has averaged 134/88 -142/76.    Patient reports she has been feeling more calm lately, anxiety hasn't been as high, have actually had a couple of very good days.     She was also started on mupirocin intranasally for  mucositis andhistory of MRSA, she feels the nose bleeds, and headaches are improving.       Current Outpatient Medications on File Prior to Visit   Medication Sig   • mupirocin (BACTROBAN) 2 % nasal ointment into the nostril(s) as directed by provider 2 (Two) Times a Day.   • aspirin  MG tablet Take 1 tablet by mouth Daily.   • clobetasol (TEMOVATE) 0.05 % cream APPLY TOPICALLY TO THE AFFECTED AREA TWICE DAILY AS NEEDED FOR ITCHING     No current facility-administered medications on file prior to visit.       Objective   Vital Signs:   /80   Pulse 91   Temp 97.8 °F (36.6 °C)   Resp 14   Ht 157.5 cm (62.01\")   Wt 56 kg (123 lb 6.4 oz)   SpO2 96%   BMI 22.56 kg/m²       Physical Exam  Vitals and nursing note reviewed.   Constitutional:       General: She is not in acute distress.     Appearance: She is well-developed.   HENT:      Head: Normocephalic and atraumatic.      Right Ear: Tympanic membrane, ear canal and external ear normal. There is no impacted cerumen.      Left Ear: There is impacted cerumen.      Nose: Congestion present.      Comments: Hyperemia and very small scabs nasal septum bilaterally     Mouth/Throat:      Pharynx: No oropharyngeal exudate.      Comments: Cobblestoning of posterior " pharynx, no lesions  Cardiovascular:      Rate and Rhythm: Normal rate and regular rhythm.      Heart sounds: No murmur heard.  Pulmonary:      Effort: Pulmonary effort is normal.      Breath sounds: Normal breath sounds. No wheezing.   Musculoskeletal:         General: Normal range of motion.   Skin:     General: Skin is warm and dry.      Findings: No rash.   Neurological:      Mental Status: She is alert and oriented to person, place, and time.   Psychiatric:      Comments: Anxious, pressured tangential speech            Hospital Outpatient Visit on 04/27/2022   Component Date Value Ref Range Status   • Target HR (85%) 04/27/2022 134  bpm In process   • Max. Pred. HR (100%) 04/27/2022 158  bpm In process   Office Visit on 04/27/2022   Component Date Value Ref Range Status   • 25 Hydroxy, Vitamin D 04/27/2022 21.8 (A) 30.0 - 100.0 ng/mL Final    Comment: Vitamin D deficiency has been defined by the [place] [employer] and an Endocrine Society practice guideline as a  level of serum 25-OH vitamin D less than 20 ng/mL (1,2).  The Endocrine Society went on to further define vitamin D  insufficiency as a level between 21 and 29 ng/mL (2).  1. IOM ([place] [employer]). 2010. Dietary reference     intakes for calcium and D. [place]: The     National Academies Press.  2. Teresa MF, Brandi BARRETT, Raquel JIMENEZ, et al.     Evaluation, treatment, and prevention of vitamin D     deficiency: an Endocrine Society clinical practice     guideline. JCEM. 2011 Jul; 96(7):1911-30.     • Magnesium 04/27/2022 2.1  1.6 - 2.3 mg/dL Final       Lab Results   Component Value Date    BUN 11 04/19/2022    CREATININE 0.70 04/19/2022     04/19/2022    K 4.7 04/19/2022     04/19/2022    CALCIUM 9.7 04/19/2022    ALBUMIN 4.6 04/19/2022    BILITOT 0.5 04/19/2022    ALKPHOS 96 04/19/2022    AST 35 04/19/2022    ALT 27 04/19/2022    CHLPL 186 04/19/2022    TRIG 238 (H) 04/19/2022    HDL 32 (L) 04/19/2022    VLDL 42 (H)  04/19/2022     (H) 04/19/2022    WBC 4.9 04/19/2022    RBC 4.87 04/19/2022    HCT 45.0 04/19/2022    MCV 92 04/19/2022    MCH 31.6 04/19/2022    TSH 3.610 04/19/2022    FREET4 1.10 04/19/2022    CYZU26PB 21.8 (L) 04/27/2022        No results found for: HGBA1C  The 10-year ASCVD risk score (Showell MARYANA Sprague, et al., 2013) is: 19.3%    Values used to calculate the score:      Age: 61 years      Sex: Female      Is Non- : No      Diabetic: No      Tobacco smoker: Yes      Systolic Blood Pressure: 154 mmHg      Is BP treated: Yes      HDL Cholesterol: 32 mg/dL      Total Cholesterol: 186 mg/dL      ECG 12 Lead    Date/Time: 4/27/2022 12:38 PM  Performed by: Ariana Chang MD  Authorized by: Ariana Chang MD   Comparison: not compared with previous ECG   Previous ECG: no previous ECG available  BPM: 76    Clinical impression: normal ECG                Assessment and Plan    Diagnoses and all orders for this visit:    1. Essential hypertension (Primary)  -     Cancel: Adult Transthoracic Echo Complete W/ Cont if Necessary Per Protocol; Future  -     Cancel: Adult Transthoracic Echo Complete W/ Cont if Necessary Per Protocol; Future    2. Palpitations  Comments:  apple watch reporting atrial fibrillation episodes  Orders:  -     ECG 12 Lead  -     Cancel: Adult Transthoracic Echo Complete W/ Cont if Necessary Per Protocol; Future  -     XR Chest 2 View; Future  -     Holter Monitor - 72 Hour Up To 15 Days; Future  -     Cancel: Adult Transthoracic Echo Complete W/ Cont if Necessary Per Protocol; Future    3. Anxiety    4. MRSA (methicillin resistant Staphylococcus aureus) infection    5. Mixed hyperlipidemia  -     Discontinue: rosuvastatin (Crestor) 5 MG tablet; Take 1 tablet by mouth Daily.  Dispense: 30 tablet; Refill: 3    6. Vitamin D deficiency  -     Vitamin D 25 Hydroxy  -     Magnesium    7. Thrombocytopenia (HCC)    8. Low serum parathyroid hormone (PTH)  -      Magnesium    9. Amaurosis fugax  -     Cancel: Adult Transthoracic Echo Complete W/ Cont if Necessary Per Protocol; Future  -     Holter Monitor - 72 Hour Up To 15 Days; Future  -     Cancel: Adult Transthoracic Echo Complete W/ Cont if Necessary Per Protocol; Future    10. Dyspnea, unspecified type  -     ECG 12 Lead  -     Cancel: Adult Transthoracic Echo Complete W/ Cont if Necessary Per Protocol; Future  -     Cancel: Adult Transthoracic Echo Complete W/ Cont if Necessary Per Protocol; Future    11. Cough  -     Cancel: Adult Transthoracic Echo Complete W/ Cont if Necessary Per Protocol; Future  -     XR Chest 2 View; Future  -     Cancel: Adult Transthoracic Echo Complete W/ Cont if Necessary Per Protocol; Future    12. Cigarette smoker  -     XR Chest 2 View; Future    13. Allergic rhinitis, unspecified seasonality, unspecified trigger  -     fexofenadine (Allegra Allergy) 180 MG tablet; Take 1 tablet by mouth Daily.  Dispense: 30 tablet; Refill: 12  -     Triamcinolone Acetonide (Nasacort Allergy 24HR) 55 MCG/ACT nasal inhaler; 2 sprays into the nostril(s) as directed by provider Daily.  Dispense: 16.5 g; Refill: 11    14. History of positive PPD, treatment status unknown  -     XR Chest 2 View; Future    15. Leg cramps  -     Magnesium        Hypertension currently improved with lisinopril    Patient reports additional episodes on her watch telling her she has a atrial fibrillation longest lasting 15 minutes about 4 other occasions just for a very brief amount of time.  EKG today is normal we will place a 7-day Holter monitor to try to catch the arrhythmia.  Patient is very resistant/fearful to treatment and evaluation.  Did encourage her to start aspirin.    Hyperlipidemia with elevated atherosclerotic cardiovascular disease risk, starting on low-dose Crestor    Minimally low PTH, patient does report history of vitamin D deficiency, will check level, additionally patient reports leg cramps we will check  magnesium level as this can also contribute to arrhythmias.    Spent 50 minutes caring for Meeta on date of service including reviewing records, obtaining history, performing physical examination, ordering medications and tests, and counseling and coordination of care.  An additional 5 minutes was used to review and document results of EKG.  There are no discontinued medications.      Follow Up     Return in about 10 days (around 5/7/2022) for Recheck, hotor monitor,  htn.    Patient was given instructions and counseling regarding her condition or for health maintenance advice. Please see specific information pulled into the AVS if appropriate.

## 2022-04-28 ENCOUNTER — TELEPHONE (OUTPATIENT)
Dept: FAMILY MEDICINE CLINIC | Facility: CLINIC | Age: 62
End: 2022-04-28

## 2022-04-28 LAB
25(OH)D3+25(OH)D2 SERPL-MCNC: 21.8 NG/ML (ref 30–100)
MAGNESIUM SERPL-MCNC: 2.1 MG/DL (ref 1.6–2.3)

## 2022-05-01 DIAGNOSIS — E55.9 VITAMIN D DEFICIENCY: Primary | ICD-10-CM

## 2022-05-01 NOTE — TELEPHONE ENCOUNTER
Please inform patient she should be taking aspirin daily.   As for her lisinopril she should continue 10 mg daily unless blood pressures are greater then 140/90 and then should be increased to 20 mg daily which may be taken at the same time.  Please ask what her blood pressures are doing and is she having dizziness at the same time as high or low blood pressures.  Also if she has had [facility] please get report for review.

## 2022-05-02 NOTE — TELEPHONE ENCOUNTER
Spoke with Meeta, she said she is taking aspirin every other day due to her nose blooeds she has been having and is afraid the aspirin will cause her to bleed even more. She did say if you wanted her to continue to take the aspirin daily she will.     She said her BP has been good and perfect the last couple days.     She is getting her chest xray on may 5th. She cannot get it before then due to the heart monitor. That comes off on may 4th.     Meeta states she becomes dizzy when she bends over and raises up, will last for around 30 seconds. She is unaware if her bp has any affect.     Please advise.

## 2022-05-02 NOTE — TELEPHONE ENCOUNTER
Please ask if she has continued to have nosebleeds since starting aspirin or if she is referring to the nosebleeds occur before her first appointment with me.  Would like her to take aspirin every day but every other day is better than not at all.  Alternatively she could take an 81 mg aspirin (baby aspirin) every day.  As far as the dizziness when bending over, that may be orthostasis, she needs to stay well-hydrated slowly and not start walking until she is sure she is steady.  I will be watching for results on Holter and chest x-ray

## 2022-05-05 ENCOUNTER — TELEPHONE (OUTPATIENT)
Dept: FAMILY MEDICINE CLINIC | Facility: CLINIC | Age: 62
End: 2022-05-05

## 2022-05-05 ENCOUNTER — PATIENT MESSAGE (OUTPATIENT)
Dept: FAMILY MEDICINE CLINIC | Facility: CLINIC | Age: 62
End: 2022-05-05

## 2022-05-05 DIAGNOSIS — I10 ESSENTIAL HYPERTENSION: ICD-10-CM

## 2022-05-05 RX ORDER — LISINOPRIL 10 MG/1
20 TABLET ORAL DAILY
Qty: 30 TABLET | Refills: 0
Start: 2022-05-05 | End: 2022-05-09

## 2022-05-09 RX ORDER — LISINOPRIL 10 MG/1
TABLET ORAL
Qty: 90 TABLET | Refills: 0 | Status: SHIPPED | OUTPATIENT
Start: 2022-05-09 | End: 2022-05-24 | Stop reason: SINTOL

## 2022-05-10 ENCOUNTER — OFFICE VISIT (OUTPATIENT)
Dept: FAMILY MEDICINE CLINIC | Facility: CLINIC | Age: 62
End: 2022-05-10

## 2022-05-10 VITALS
WEIGHT: 123 LBS | DIASTOLIC BLOOD PRESSURE: 100 MMHG | TEMPERATURE: 98.7 F | SYSTOLIC BLOOD PRESSURE: 154 MMHG | HEART RATE: 101 BPM | BODY MASS INDEX: 22.63 KG/M2 | OXYGEN SATURATION: 98 % | HEIGHT: 62 IN | RESPIRATION RATE: 14 BRPM

## 2022-05-10 DIAGNOSIS — K75.81 NASH (NONALCOHOLIC STEATOHEPATITIS): ICD-10-CM

## 2022-05-10 DIAGNOSIS — I10 ESSENTIAL HYPERTENSION: ICD-10-CM

## 2022-05-10 DIAGNOSIS — E55.9 VITAMIN D DEFICIENCY: ICD-10-CM

## 2022-05-10 DIAGNOSIS — F41.9 ANXIETY: Primary | ICD-10-CM

## 2022-05-10 DIAGNOSIS — F17.210 CIGARETTE SMOKER: ICD-10-CM

## 2022-05-10 DIAGNOSIS — F43.9 STRESS: ICD-10-CM

## 2022-05-10 PROCEDURE — 99214 OFFICE O/P EST MOD 30 MIN: CPT | Performed by: FAMILY MEDICINE

## 2022-05-10 RX ORDER — BUPROPION HYDROCHLORIDE 150 MG/1
150 TABLET ORAL DAILY
Qty: 30 TABLET | Refills: 3 | Status: SHIPPED | OUTPATIENT
Start: 2022-05-10 | End: 2022-05-24 | Stop reason: SINTOL

## 2022-05-11 LAB
MAXIMAL PREDICTED HEART RATE: 158 BPM
STRESS TARGET HR: 134 BPM

## 2022-05-11 PROCEDURE — 93244 EXT ECG>48HR<7D REV&INTERPJ: CPT | Performed by: INTERNAL MEDICINE

## 2022-05-16 ENCOUNTER — HOSPITAL ENCOUNTER (OUTPATIENT)
Dept: CARDIOLOGY | Facility: HOSPITAL | Age: 62
Discharge: HOME | End: 2022-05-16
Admitting: FAMILY MEDICINE

## 2022-05-16 ENCOUNTER — TELEPHONE (OUTPATIENT)
Dept: FAMILY MEDICINE CLINIC | Facility: CLINIC | Age: 62
End: 2022-05-16

## 2022-05-16 VITALS
WEIGHT: 123.02 LBS | DIASTOLIC BLOOD PRESSURE: 67 MMHG | BODY MASS INDEX: 22.64 KG/M2 | HEIGHT: 62 IN | SYSTOLIC BLOOD PRESSURE: 145 MMHG

## 2022-05-16 DIAGNOSIS — G45.3 AMAUROSIS FUGAX: ICD-10-CM

## 2022-05-16 DIAGNOSIS — I48.91 A-FIB: ICD-10-CM

## 2022-05-16 DIAGNOSIS — F41.9 ANXIETY: Primary | ICD-10-CM

## 2022-05-16 PROCEDURE — 93306 TTE W/DOPPLER COMPLETE: CPT | Performed by: INTERNAL MEDICINE

## 2022-05-16 PROCEDURE — 93306 TTE W/DOPPLER COMPLETE: CPT

## 2022-05-16 RX ORDER — FLUOXETINE 10 MG/1
10 CAPSULE ORAL DAILY
Qty: 30 CAPSULE | Refills: 0 | Status: SHIPPED | OUTPATIENT
Start: 2022-05-16 | End: 2022-05-24 | Stop reason: SINTOL

## 2022-05-16 NOTE — TELEPHONE ENCOUNTER
Please let patient know I have sent in a prescription for low-dose fluoxetine to take instead of Effexor.

## 2022-05-16 NOTE — TELEPHONE ENCOUNTER
Patient called into the office in regards to a medication she was recently given buPROPion XL (Wellbutrin XL) 150 MG 24 hr tablet.  Patient stated over the past 7 days, her nausea and shaking has increased.  Patient did not take medication today due to these symptoms.  Patient would like recommendation from nurse/doctor.  Please advise when possible.

## 2022-05-17 NOTE — TELEPHONE ENCOUNTER
Pt wants to know if there is anything she should be looking out for with this new medication.    Side note she mentioned there was a discussion last appointment for an MRI on her brain. I did not see an order for an MRI. Please advise.

## 2022-05-17 NOTE — TELEPHONE ENCOUNTER
Please tell her fluoxetine can cause some jitteriness or GI side effects but they tend to be mild and tend to get better if you can stick with the medicine for 7 to 10 days.  The GI side effects may be some mild nausea or loose stools.    Please ask her to remind me as to why we were talking about an MRI, my note indicates she was having headaches but they had improved somewhat.  I will review my notes in more detail later to try to remind myself of the conversation.

## 2022-05-18 LAB
BH CV ECHO MEAS - ACS: 1.5 CM
BH CV ECHO MEAS - AO MAX PG: 5.9 MMHG
BH CV ECHO MEAS - AO MEAN PG: 2.9 MMHG
BH CV ECHO MEAS - AO ROOT DIAM: 2.7 CM
BH CV ECHO MEAS - AO V2 MAX: 121.9 CM/SEC
BH CV ECHO MEAS - AO V2 VTI: 25.5 CM
BH CV ECHO MEAS - AVA(I,D): 2.8 CM2
BH CV ECHO MEAS - EDV(CUBED): 95 ML
BH CV ECHO MEAS - EDV(MOD-SP4): 60.3 ML
BH CV ECHO MEAS - EF(MOD-BP): 65 %
BH CV ECHO MEAS - EF(MOD-SP4): 64.5 %
BH CV ECHO MEAS - ESV(CUBED): 28.9 ML
BH CV ECHO MEAS - ESV(MOD-SP4): 21.4 ML
BH CV ECHO MEAS - FS: 32.8 %
BH CV ECHO MEAS - IVS/LVPW: 1.02 CM
BH CV ECHO MEAS - IVSD: 0.94 CM
BH CV ECHO MEAS - LA DIMENSION: 2.8 CM
BH CV ECHO MEAS - LV DIASTOLIC VOL/BSA (35-75): 38.8 CM2
BH CV ECHO MEAS - LV MASS(C)D: 143 GRAMS
BH CV ECHO MEAS - LV MAX PG: 4.4 MMHG
BH CV ECHO MEAS - LV MEAN PG: 1.94 MMHG
BH CV ECHO MEAS - LV SYSTOLIC VOL/BSA (12-30): 13.8 CM2
BH CV ECHO MEAS - LV V1 MAX: 104.7 CM/SEC
BH CV ECHO MEAS - LV V1 VTI: 22.3 CM
BH CV ECHO MEAS - LVIDD: 4.6 CM
BH CV ECHO MEAS - LVIDS: 3.1 CM
BH CV ECHO MEAS - LVOT AREA: 3.2 CM2
BH CV ECHO MEAS - LVOT DIAM: 2 CM
BH CV ECHO MEAS - LVPWD: 0.93 CM
BH CV ECHO MEAS - MR MAX PG: 57.2 MMHG
BH CV ECHO MEAS - MR MAX VEL: 378 CM/SEC
BH CV ECHO MEAS - MV A MAX VEL: 81.9 CM/SEC
BH CV ECHO MEAS - MV DEC SLOPE: 287.3 CM/SEC2
BH CV ECHO MEAS - MV DEC TIME: 0.3 MSEC
BH CV ECHO MEAS - MV E MAX VEL: 86.6 CM/SEC
BH CV ECHO MEAS - MV E/A: 1.06
BH CV ECHO MEAS - MV MAX PG: 2.8 MMHG
BH CV ECHO MEAS - MV MEAN PG: 1.26 MMHG
BH CV ECHO MEAS - MV V2 VTI: 24.6 CM
BH CV ECHO MEAS - MVA(VTI): 2.9 CM2
BH CV ECHO MEAS - PA ACC TIME: 0.11 SEC
BH CV ECHO MEAS - PA PR(ACCEL): 31.5 MMHG
BH CV ECHO MEAS - PA V2 MAX: 95.6 CM/SEC
BH CV ECHO MEAS - PI END-D VEL: 81.7 CM/SEC
BH CV ECHO MEAS - PULM A REVS DUR: 0.1 SEC
BH CV ECHO MEAS - PULM A REVS VEL: 24.8 CM/SEC
BH CV ECHO MEAS - PULM DIAS VEL: 46.6 CM/SEC
BH CV ECHO MEAS - PULM S/D: 1.08
BH CV ECHO MEAS - PULM SYS VEL: 50.1 CM/SEC
BH CV ECHO MEAS - RAP SYSTOLE: 10 MMHG
BH CV ECHO MEAS - RVSP: 40.6 MMHG
BH CV ECHO MEAS - SI(MOD-SP4): 25 ML/M2
BH CV ECHO MEAS - SV(LVOT): 70.5 ML
BH CV ECHO MEAS - SV(MOD-SP4): 38.9 ML
BH CV ECHO MEAS - TAPSE (>1.6): 2 CM
BH CV ECHO MEAS - TR MAX PG: 30.6 MMHG
BH CV ECHO MEAS - TR MAX VEL: 276.5 CM/SEC
BH CV XLRA - RV BASE: 2.5 CM
BH CV XLRA - RV MID: 1.4 CM
LV EF 2D ECHO EST: 65 %
MAXIMAL PREDICTED HEART RATE: 158 BPM
STRESS TARGET HR: 134 BPM

## 2022-05-24 ENCOUNTER — OFFICE VISIT (OUTPATIENT)
Dept: FAMILY MEDICINE CLINIC | Facility: CLINIC | Age: 62
End: 2022-05-24

## 2022-05-24 VITALS
RESPIRATION RATE: 16 BRPM | WEIGHT: 123.8 LBS | OXYGEN SATURATION: 99 % | HEIGHT: 63 IN | SYSTOLIC BLOOD PRESSURE: 138 MMHG | HEART RATE: 93 BPM | TEMPERATURE: 97.8 F | BODY MASS INDEX: 21.93 KG/M2 | DIASTOLIC BLOOD PRESSURE: 88 MMHG

## 2022-05-24 DIAGNOSIS — I10 ESSENTIAL HYPERTENSION: ICD-10-CM

## 2022-05-24 DIAGNOSIS — I51.89 GRADE I DIASTOLIC DYSFUNCTION: ICD-10-CM

## 2022-05-24 DIAGNOSIS — R05.8 COUGH DUE TO ACE INHIBITOR: ICD-10-CM

## 2022-05-24 DIAGNOSIS — T88.7XXA MEDICATION SIDE EFFECTS: ICD-10-CM

## 2022-05-24 DIAGNOSIS — G45.3 AMAUROSIS FUGAX: ICD-10-CM

## 2022-05-24 DIAGNOSIS — F17.210 CIGARETTE SMOKER: ICD-10-CM

## 2022-05-24 DIAGNOSIS — K75.81 NASH (NONALCOHOLIC STEATOHEPATITIS): ICD-10-CM

## 2022-05-24 DIAGNOSIS — Z86.19 HISTORY OF LYME DISEASE: ICD-10-CM

## 2022-05-24 DIAGNOSIS — R05.9 COUGH: ICD-10-CM

## 2022-05-24 DIAGNOSIS — T46.4X5A COUGH DUE TO ACE INHIBITOR: ICD-10-CM

## 2022-05-24 DIAGNOSIS — F41.9 ANXIETY: Primary | ICD-10-CM

## 2022-05-24 DIAGNOSIS — E55.9 VITAMIN D DEFICIENCY: ICD-10-CM

## 2022-05-24 PROCEDURE — 99215 OFFICE O/P EST HI 40 MIN: CPT | Performed by: FAMILY MEDICINE

## 2022-05-24 RX ORDER — LOSARTAN POTASSIUM 25 MG/1
25 TABLET ORAL DAILY
Qty: 90 TABLET | Refills: 1 | Status: SHIPPED | OUTPATIENT
Start: 2022-05-24

## 2022-05-24 RX ORDER — LOSARTAN POTASSIUM 25 MG/1
25 TABLET ORAL DAILY
Qty: 30 TABLET | Refills: 12 | Status: SHIPPED | OUTPATIENT
Start: 2022-05-24 | End: 2022-05-24

## 2022-05-24 RX ORDER — DOXYCYCLINE HYCLATE 100 MG/1
100 CAPSULE ORAL 2 TIMES DAILY
Qty: 20 CAPSULE | Refills: 0 | Status: SHIPPED | OUTPATIENT
Start: 2022-05-24 | End: 2022-06-07 | Stop reason: SINTOL

## 2022-05-24 RX ORDER — ASPIRIN 81 MG/1
81 TABLET ORAL DAILY
COMMUNITY

## 2022-05-24 NOTE — PROGRESS NOTES
Chief Complaint  Anxiety and Hypertension     History of Present Illness  Meeta Daley presents today for follow up on anxiety and hypertension. Overall feel better than last visit. Anxity has improved. Have had some blurry vision but nto blind spots.  She was last seen on 05/10/2022 and was started on Wellbutrin 150mg at that time. Meeta reports that she developed tremors, nausea and had pre-syncope episodes while taking generic wellbutrin, would like to try brand name. She has since discontinued taking the medication. She also reports that she did not restart prozac as she has experience side effects from it previously and did not want to restart it.  Feel best when BP in 130's, feel bad when down to 110  She reports that she has developed a dry cough and is attributing it to her lisinopril use. Yesterday she decreased her lisinopril down to just one tablet daily, states did not cough is much.  Doing well with Crestor.   Claims getting thrush from nasal steroid spray, comfortable sore, going to restart probiotics  Cough is causing daily mild nose bleeds.  Forgot about nasal antibiotic    Current Outpatient Medications on File Prior to Visit   Medication Sig   • aspirin 81 MG EC tablet Take 81 mg by mouth Daily.   • clobetasol (TEMOVATE) 0.05 % cream APPLY TOPICALLY TO THE AFFECTED AREA TWICE DAILY AS NEEDED FOR ITCHING   • fexofenadine (Allegra Allergy) 180 MG tablet Take 1 tablet by mouth Daily.   • rosuvastatin (CRESTOR) 5 MG tablet TAKE 1 TABLET BY MOUTH DAILY   • Triamcinolone Acetonide (Nasacort Allergy 24HR) 55 MCG/ACT nasal inhaler 2 sprays into the nostril(s) as directed by provider Daily.   • vitamin D3 (Vitamin D) 125 MCG (5000 UT) capsule capsule Take 1 capsule by mouth Daily.   • [DISCONTINUED] lisinopril (PRINIVIL,ZESTRIL) 10 MG tablet TAKE 1 TABLET BY MOUTH DAILY   • mupirocin (BACTROBAN) 2 % nasal ointment into the nostril(s) as directed by provider 2 (Two) Times a Day.   • [DISCONTINUED]  "aspirin  MG tablet Take 1 tablet by mouth Daily.   • [DISCONTINUED] buPROPion XL (Wellbutrin XL) 150 MG 24 hr tablet Take 1 tablet by mouth Daily.   • [DISCONTINUED] FLUoxetine (PROzac) 10 MG capsule Take 1 capsule by mouth Daily.     No current facility-administered medications on file prior to visit.       Objective   Vital Signs:   /88 (BP Location: Left arm, Patient Position: Sitting, Cuff Size: Adult)   Pulse 93   Temp 97.8 °F (36.6 °C) (Temporal)   Resp 16   Ht 160.7 cm (63.27\")   Wt 56.2 kg (123 lb 12.8 oz)   SpO2 99% Comment: Room air  BMI 21.74 kg/m²       Physical Exam  Vitals and nursing note reviewed.   Constitutional:       General: She is not in acute distress.     Appearance: She is well-developed.   HENT:      Head: Normocephalic and atraumatic.      Right Ear: Tympanic membrane, ear canal and external ear normal.      Left Ear: Tympanic membrane, ear canal and external ear normal.      Ears:      Comments: Abundant cerumen worse left than right not completely occluding external auditory canal     Nose: Congestion present.      Comments: Hyperemia and ulcers anterior nasal septum bilaterally     Mouth/Throat:      Pharynx: No oropharyngeal exudate.      Comments: Cobblestoning of posterior pharynx, no lesions  Cardiovascular:      Rate and Rhythm: Normal rate and regular rhythm.      Heart sounds: No murmur heard.  Pulmonary:      Effort: Pulmonary effort is normal.      Breath sounds: Normal breath sounds. No wheezing.   Musculoskeletal:         General: Normal range of motion.   Skin:     General: Skin is warm and dry.      Findings: No rash.   Neurological:      Mental Status: She is alert and oriented to person, place, and time.   Psychiatric:      Comments: Anxious, pressured tangential speech            No visits with results within 1 Day(s) from this visit.   Latest known visit with results is:   Hospital Outpatient Visit on 05/16/2022   Component Date Value Ref Range Status "   • Target HR (85%) 05/16/2022 134  bpm Final   • Max. Pred. HR (100%) 05/16/2022 158  bpm Final   • RV Base 05/16/2022 2.50  cm Final   • RV Mid 05/16/2022 1.40  cm Final   • ACS 05/16/2022 1.50  cm Final   • Ao root diam 05/16/2022 2.7  cm Final   • Ao pk thomas 05/16/2022 121.9  cm/sec Final   • Ao V2 VTI 05/16/2022 25.5  cm Final   • ADRIANA(I,D) 05/16/2022 2.8  cm2 Final   • EDV(cubed) 05/16/2022 95.0  ml Final   • EDV(MOD-sp4) 05/16/2022 60.3  ml Final   • EF(MOD-bp) 05/16/2022 65.0  % Final   • EF(MOD-sp4) 05/16/2022 64.5  % Final   • ESV(cubed) 05/16/2022 28.9  ml Final   • ESV(MOD-sp4) 05/16/2022 21.4  ml Final   • IVS/LVPW 05/16/2022 1.02  cm Final   • LV mass(C)d 05/16/2022 143.0  grams Final   • LV V1 max PG 05/16/2022 4.4  mmHg Final   • LV V1 mean PG 05/16/2022 1.94  mmHg Final   • LV V1 max 05/16/2022 104.7  cm/sec Final   • LVPWd 05/16/2022 0.93  cm Final   • MR max PG 05/16/2022 57.2  mmHg Final   • MV dec slope 05/16/2022 287.3  cm/sec2 Final   • MV dec time 05/16/2022 0.30  msec Final   • MV V2 VTI 05/16/2022 24.6  cm Final   • MVA(VTI) 05/16/2022 2.9  cm2 Final   • PA acc time 05/16/2022 0.11  sec Final   • PA pr(Accel) 05/16/2022 31.5  mmHg Final   • PA V2 max 05/16/2022 95.6  cm/sec Final   • PI end-d thomas 05/16/2022 81.7  cm/sec Final   • Pulm A Revs Thomas 05/16/2022 24.8  cm/sec Final   • RAP systole 05/16/2022 10.0  mmHg Final   • RVSP(TR) 05/16/2022 40.6  mmHg Final   • SI(MOD-sp4) 05/16/2022 25.0  ml/m2 Final   • SV(LVOT) 05/16/2022 70.5  ml Final   • SV(MOD-sp4) 05/16/2022 38.9  ml Final   • TR max PG 05/16/2022 30.6  mmHg Final   • Ao max PG 05/16/2022 5.9  mmHg Final   • Ao mean PG 05/16/2022 2.9  mmHg Final   • FS 05/16/2022 32.8  % Final   • IVSd 05/16/2022 0.94  cm Final   • LA dimension (2D)  05/16/2022 2.8  cm Final   • LV V1 VTI 05/16/2022 22.3  cm Final   • LVIDd 05/16/2022 4.6  cm Final   • LVIDs 05/16/2022 3.1  cm Final   • LVOT area 05/16/2022 3.2  cm2 Final   • LVOT diam 05/16/2022  2.00  cm Final   • MV E/A 05/16/2022 1.06   Final   • MV max PG 05/16/2022 2.8  mmHg Final   • MV mean PG 05/16/2022 1.26  mmHg Final   • Pulm S/D 05/16/2022 1.08   Final   • MR max thomas 05/16/2022 378.0  cm/sec Final   • MV A max thomas 05/16/2022 81.9  cm/sec Final   • MV E max thomas 05/16/2022 86.6  cm/sec Final   • Pulm A Revs Dur 05/16/2022 0.10  sec Final   • Pulm Nava Thomas 05/16/2022 46.6  cm/sec Final   • Pulm Sys Thomas 05/16/2022 50.1  cm/sec Final   • TR max thomas 05/16/2022 276.5  cm/sec Final   • LV Nava Vol (BSA corrected) 05/16/2022 38.8  cm2 Final   • LV Sys Vol (BSA corrected) 05/16/2022 13.8  cm2 Final   • TAPSE (>1.6) 05/16/2022 2.00  cm Final   • Echo EF Estimated 05/16/2022 65  % Final       Lab Results   Component Value Date    BUN 11 04/19/2022    CREATININE 0.70 04/19/2022     04/19/2022    K 4.7 04/19/2022     04/19/2022    CALCIUM 9.7 04/19/2022    ALBUMIN 4.6 04/19/2022    BILITOT 0.5 04/19/2022    ALKPHOS 96 04/19/2022    AST 35 04/19/2022    ALT 27 04/19/2022    CHLPL 186 04/19/2022    TRIG 238 (H) 04/19/2022    HDL 32 (L) 04/19/2022    VLDL 42 (H) 04/19/2022     (H) 04/19/2022    WBC 4.9 04/19/2022    RBC 4.87 04/19/2022    HCT 45.0 04/19/2022    MCV 92 04/19/2022    MCH 31.6 04/19/2022    TSH 3.610 04/19/2022    FREET4 1.10 04/19/2022    [id number] 21.8 (L) 04/27/2022        No results found for: HGBA1C             Assessment and Plan    Diagnoses and all orders for this visit:    1. Anxiety (Primary)  -     [facility] - Swab,; Future    2. Essential hypertension  -     Discontinue: losartan (COZAAR) 25 MG tablet; Take 1 tablet by mouth Daily.  Dispense: 30 tablet; Refill: 12    3. Cigarette smoker    4. Amaurosis fugax    5. Vitamin D deficiency    6. Cough    7. Cough due to ACE inhibitor    8. ROBLES (nonalcoholic steatohepatitis)    9. History of Lyme disease  -     doxycycline (VIBRAMYCIN) 100 MG capsule; Take 1 capsule by mouth 2 (Two) Times a Day.  Dispense: 20 capsule;  Refill: 0    10. Grade I diastolic dysfunction  -     Discontinue: losartan (COZAAR) 25 MG tablet; Take 1 tablet by mouth Daily.  Dispense: 30 tablet; Refill: 12    11. Medication side effects  -     [facility] - Swab,; Future      Anxiety somewhat improved not currently on medication patient is fearful of side effects, ordering [facility] testing.    History of Lyme's disease symptoms 1 year prior to treatment, has been treated with doxycycline, total of at least  three10-day courses for recurrent symptoms.  Patient reports symptoms of arthralgia, fatigue, palpitations have returned and has requested additional treatment.  Do recommend referral to infectious disease, patient is not interested at this time.  Patient is very much antimedication and repeatedly asking about herbal treatments.  Advised I do not recommend herbals but she can research safety of herbals on NIH.gov/Medline, paying special attention to any precautions with liver disease.      Medications Discontinued During This Encounter   Medication Reason   • aspirin  MG tablet Dose adjustment   • buPROPion XL (Wellbutrin XL) 150 MG 24 hr tablet Side effects   • FLUoxetine (PROzac) 10 MG capsule Side effects   • lisinopril (PRINIVIL,ZESTRIL) 10 MG tablet Side effects       I spent 45 minutes caring for Meeta on this date of service. This time includes time spent by me in the following activities:preparing for the visit, reviewing tests, obtaining and/or reviewing a separately obtained history, performing a medically appropriate examination and/or evaluation , counseling and educating the patient/family/caregiver, ordering medications, tests, or procedures, referring and communicating with other health care professionals  and documenting information in the medical record  Follow Up     Return in about 2 weeks (around 6/7/2022) for Recheck, htn.    Patient was given instructions and counseling regarding her condition or for health maintenance advice.  Please see specific information pulled into the AVS if appropriate.

## 2022-06-07 ENCOUNTER — OFFICE VISIT (OUTPATIENT)
Dept: FAMILY MEDICINE CLINIC | Facility: CLINIC | Age: 62
End: 2022-06-07

## 2022-06-07 VITALS
HEART RATE: 99 BPM | RESPIRATION RATE: 16 BRPM | WEIGHT: 123.6 LBS | BODY MASS INDEX: 21.9 KG/M2 | SYSTOLIC BLOOD PRESSURE: 142 MMHG | DIASTOLIC BLOOD PRESSURE: 78 MMHG | HEIGHT: 63 IN | TEMPERATURE: 98.2 F | OXYGEN SATURATION: 98 %

## 2022-06-07 DIAGNOSIS — R79.89 LOW SERUM PARATHYROID HORMONE (PTH): ICD-10-CM

## 2022-06-07 DIAGNOSIS — E55.9 VITAMIN D DEFICIENCY: ICD-10-CM

## 2022-06-07 DIAGNOSIS — H69.82 DYSFUNCTION OF LEFT EUSTACHIAN TUBE: ICD-10-CM

## 2022-06-07 DIAGNOSIS — I10 ESSENTIAL HYPERTENSION: Primary | ICD-10-CM

## 2022-06-07 DIAGNOSIS — K74.69 OTHER CIRRHOSIS OF LIVER: ICD-10-CM

## 2022-06-07 DIAGNOSIS — E78.2 MIXED HYPERLIPIDEMIA: ICD-10-CM

## 2022-06-07 DIAGNOSIS — R04.0 EPISTAXIS: ICD-10-CM

## 2022-06-07 DIAGNOSIS — K75.81 NASH (NONALCOHOLIC STEATOHEPATITIS): ICD-10-CM

## 2022-06-07 DIAGNOSIS — F41.9 ANXIETY: ICD-10-CM

## 2022-06-07 PROCEDURE — 99214 OFFICE O/P EST MOD 30 MIN: CPT | Performed by: FAMILY MEDICINE

## 2022-06-07 NOTE — PROGRESS NOTES
Chief Complaint  Hypertension     History of Present Illness  Meeta Daley presents today for follow up on her anxiety and hypertension. Patient is taking Losartan for her hypertension. Patient stopped taking doxycycline after one week of use. Caused itchy and painful rash that appeared on her face and hands. She states she felt so much better the first few days after use, until rash started    She states she has non alcoholic cirrhosis and her liver isn't working as well to distribute the medication.     Pt reports her eyes are imporiving, not blacking over. However her eyes are becoming more blurry. Pt reports she saw an eye . She was told there was nothing wrong with her eyes. Pt Blood pressure at home have been 120-154 /61-72. Feel best if around 135    Nose bleeds much rafat frequent last 2 days ago was not bad.     Did get lot of wax from left ear.     Current Outpatient Medications on File Prior to Visit   Medication Sig   • aspirin 81 MG EC tablet Take 81 mg by mouth Daily.   • clobetasol (TEMOVATE) 0.05 % cream APPLY TOPICALLY TO THE AFFECTED AREA TWICE DAILY AS NEEDED FOR ITCHING   • fexofenadine (Allegra Allergy) 180 MG tablet Take 1 tablet by mouth Daily.   • losartan (COZAAR) 25 MG tablet TAKE 1 TABLET BY MOUTH DAILY   • mupirocin (BACTROBAN) 2 % nasal ointment into the nostril(s) as directed by provider 2 (Two) Times a Day.   • rosuvastatin (CRESTOR) 5 MG tablet TAKE 1 TABLET BY MOUTH DAILY   • Triamcinolone Acetonide (Nasacort Allergy 24HR) 55 MCG/ACT nasal inhaler 2 sprays into the nostril(s) as directed by provider Daily.   • vitamin D3 (Vitamin D) 125 MCG (5000 UT) capsule capsule Take 1 capsule by mouth Daily.   • [DISCONTINUED] doxycycline (VIBRAMYCIN) 100 MG capsule Take 1 capsule by mouth 2 (Two) Times a Day.     No current facility-administered medications on file prior to visit.       Objective   Vital Signs:   /78   Pulse 99   Temp 98.2 °F (36.8 °C)   Resp 16   Ht  "160.7 cm (63.27\")   Wt 56.1 kg (123 lb 9.6 oz)   SpO2 98%   BMI 21.71 kg/m²       Physical Exam  Vitals and nursing note reviewed.   Constitutional:       General: She is not in acute distress.     Appearance: She is well-developed.   HENT:      Head: Normocephalic and atraumatic.      Right Ear: Tympanic membrane, ear canal and external ear normal. There is no impacted cerumen.      Left Ear: Tympanic membrane, ear canal and external ear normal. There is no impacted cerumen.      Ears:      Comments: Left external auditory canal with some cerumen, nonoccluding, significantly less than previous exam     Nose: Congestion present.      Comments: Hyperemia and ulcers anterior nasal septum is significantly improved, there is still 1 to 2 mm area of dark red crust on hyperemic base bilaterally     Mouth/Throat:      Pharynx: No oropharyngeal exudate.   Cardiovascular:      Rate and Rhythm: Normal rate and regular rhythm.      Heart sounds: No murmur heard.  Pulmonary:      Effort: Pulmonary effort is normal.      Breath sounds: Normal breath sounds. No wheezing.   Musculoskeletal:         General: Normal range of motion.   Skin:     General: Skin is warm and dry.      Findings: No rash.   Neurological:      Mental Status: She is alert and oriented to person, place, and time.            No visits with results within 1 Day(s) from this visit.   Latest known visit with results is:   Hospital Outpatient Visit on 05/16/2022   Component Date Value Ref Range Status   • Target HR (85%) 05/16/2022 134  bpm Final   • Max. Pred. HR (100%) 05/16/2022 158  bpm Final   • RV Base 05/16/2022 2.50  cm Final   • RV Mid 05/16/2022 1.40  cm Final   • ACS 05/16/2022 1.50  cm Final   • Ao root diam 05/16/2022 2.7  cm Final   • Ao pk deshawn 05/16/2022 121.9  cm/sec Final   • Ao V2 VTI 05/16/2022 25.5  cm Final   • ADRIANA(I,D) 05/16/2022 2.8  cm2 Final   • EDV(cubed) 05/16/2022 95.0  ml Final   • EDV(MOD-sp4) 05/16/2022 60.3  ml Final   • " EF(MOD-bp) 05/16/2022 65.0  % Final   • EF(MOD-sp4) 05/16/2022 64.5  % Final   • ESV(cubed) 05/16/2022 28.9  ml Final   • ESV(MOD-sp4) 05/16/2022 21.4  ml Final   • IVS/LVPW 05/16/2022 1.02  cm Final   • LV mass(C)d 05/16/2022 143.0  grams Final   • LV V1 max PG 05/16/2022 4.4  mmHg Final   • LV V1 mean PG 05/16/2022 1.94  mmHg Final   • LV V1 max 05/16/2022 104.7  cm/sec Final   • LVPWd 05/16/2022 0.93  cm Final   • MR max PG 05/16/2022 57.2  mmHg Final   • MV dec slope 05/16/2022 287.3  cm/sec2 Final   • MV dec time 05/16/2022 0.30  msec Final   • MV V2 VTI 05/16/2022 24.6  cm Final   • MVA(VTI) 05/16/2022 2.9  cm2 Final   • PA acc time 05/16/2022 0.11  sec Final   • PA pr(Accel) 05/16/2022 31.5  mmHg Final   • PA V2 max 05/16/2022 95.6  cm/sec Final   • PI end-d thomas 05/16/2022 81.7  cm/sec Final   • Pulm A Revs Thomas 05/16/2022 24.8  cm/sec Final   • RAP systole 05/16/2022 10.0  mmHg Final   • RVSP(TR) 05/16/2022 40.6  mmHg Final   • SI(MOD-sp4) 05/16/2022 25.0  ml/m2 Final   • SV(LVOT) 05/16/2022 70.5  ml Final   • SV(MOD-sp4) 05/16/2022 38.9  ml Final   • TR max PG 05/16/2022 30.6  mmHg Final   • Ao max PG 05/16/2022 5.9  mmHg Final   • Ao mean PG 05/16/2022 2.9  mmHg Final   • FS 05/16/2022 32.8  % Final   • IVSd 05/16/2022 0.94  cm Final   • LA dimension (2D)  05/16/2022 2.8  cm Final   • LV V1 VTI 05/16/2022 22.3  cm Final   • LVIDd 05/16/2022 4.6  cm Final   • LVIDs 05/16/2022 3.1  cm Final   • LVOT area 05/16/2022 3.2  cm2 Final   • LVOT diam 05/16/2022 2.00  cm Final   • MV E/A 05/16/2022 1.06   Final   • MV max PG 05/16/2022 2.8  mmHg Final   • MV mean PG 05/16/2022 1.26  mmHg Final   • Pulm S/D 05/16/2022 1.08   Final   • MR max thomas 05/16/2022 378.0  cm/sec Final   • MV A max thomas 05/16/2022 81.9  cm/sec Final   • MV E max thomas 05/16/2022 86.6  cm/sec Final   • Pulm A Revs Dur 05/16/2022 0.10  sec Final   • Pulm Nava Thomas 05/16/2022 46.6  cm/sec Final   • Pulm Sys Thomas 05/16/2022 50.1  cm/sec Final   • TR max  deshawn 05/16/2022 276.5  cm/sec Final   • LV Nava Vol (BSA corrected) 05/16/2022 38.8  cm2 Final   • LV Sys Vol (BSA corrected) 05/16/2022 13.8  cm2 Final   • TAPSE (>1.6) 05/16/2022 2.00  cm Final   • Echo EF Estimated 05/16/2022 65  % Final       Lab Results   Component Value Date    BUN 11 04/19/2022    CREATININE 0.70 04/19/2022     04/19/2022    K 4.7 04/19/2022     04/19/2022    CALCIUM 9.7 04/19/2022    ALBUMIN 4.6 04/19/2022    BILITOT 0.5 04/19/2022    ALKPHOS 96 04/19/2022    AST 35 04/19/2022    ALT 27 04/19/2022    CHLPL 186 04/19/2022    TRIG 238 (H) 04/19/2022    HDL 32 (L) 04/19/2022    VLDL 42 (H) 04/19/2022     (H) 04/19/2022    WBC 4.9 04/19/2022    RBC 4.87 04/19/2022    HCT 45.0 04/19/2022    MCV 92 04/19/2022    MCH 31.6 04/19/2022    TSH 3.610 04/19/2022    FREET4 1.10 04/19/2022    [id number] 21.8 (L) 04/27/2022        No results found for: HGBA1C             Assessment and Plan    Diagnoses and all orders for this visit:    1. Essential hypertension (Primary)  -     Comprehensive Metabolic Panel; Future    2. Anxiety    3. Epistaxis    4. Dysfunction of left eustachian tube    5. ROBLES (nonalcoholic steatohepatitis)    6. Other cirrhosis of liver (HCC)    7. Vitamin D deficiency  -     Vitamin D 25 hydroxy; Future    8. Mixed hyperlipidemia  -     Lipid Panel; Future    9. Low serum parathyroid hormone (PTH)  -     PTH, Intact & Calcium; Future    Hypertension improved, continue current medications  Recurrent epistaxis from bilateral nasal lesions improving  Anxiety persistent but somewhat improved, patient declines medication at this time.  Eustachian tube dysfunction, with nasal lesions and hypertension discussed treatment with nasal steroid or decongestants would likely be more problematic than beneficial.  Patient states symptoms are mild at this time and does not want additional treatment.  Did advise can use sweet oil before showers daily to remove excessive  cerumen        Medications Discontinued During This Encounter   Medication Reason   • doxycycline (VIBRAMYCIN) 100 MG capsule Side effects         Follow Up     Return in about 6 weeks (around 7/19/2022) for htn, with Labs.    Patient was given instructions and counseling regarding her condition or for health maintenance advice. Please see specific information pulled into the AVS if appropriate.

## 2023-02-28 ENCOUNTER — TRANSCRIBE ORDERS (OUTPATIENT)
Dept: ADMINISTRATIVE | Facility: HOSPITAL | Age: 63
End: 2023-02-28
Payer: COMMERCIAL

## 2023-02-28 DIAGNOSIS — Z13.9 SCREENING DUE: Primary | ICD-10-CM

## 2023-03-20 ENCOUNTER — HOSPITAL ENCOUNTER (EMERGENCY)
Facility: HOSPITAL | Age: 63
Discharge: HOME | End: 2023-03-20
Attending: EMERGENCY MEDICINE | Admitting: EMERGENCY MEDICINE
Payer: COMMERCIAL

## 2023-03-20 ENCOUNTER — APPOINTMENT (OUTPATIENT)
Dept: GENERAL RADIOLOGY | Facility: HOSPITAL | Age: 63
End: 2023-03-20
Payer: COMMERCIAL

## 2023-03-20 ENCOUNTER — APPOINTMENT (OUTPATIENT)
Dept: CT IMAGING | Facility: HOSPITAL | Age: 63
End: 2023-03-20
Payer: COMMERCIAL

## 2023-03-20 VITALS
HEIGHT: 62 IN | DIASTOLIC BLOOD PRESSURE: 99 MMHG | HEART RATE: 112 BPM | OXYGEN SATURATION: 97 % | BODY MASS INDEX: 23.37 KG/M2 | TEMPERATURE: 98.2 F | WEIGHT: 127 LBS | SYSTOLIC BLOOD PRESSURE: 172 MMHG | RESPIRATION RATE: 14 BRPM
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
## 2023-03-20 DIAGNOSIS — S13.9XXA NECK SPRAIN, INITIAL ENCOUNTER: ICD-10-CM

## 2023-03-20 DIAGNOSIS — S00.03XA CONTUSION OF SCALP, INITIAL ENCOUNTER: ICD-10-CM

## 2023-03-20 PROCEDURE — 73564 X-RAY EXAM KNEE 4 OR MORE: CPT

## 2023-03-20 PROCEDURE — 99283 EMERGENCY DEPT VISIT LOW MDM: CPT

## 2023-03-20 PROCEDURE — 70450 CT HEAD/BRAIN W/O DYE: CPT

## 2023-03-20 PROCEDURE — 72125 CT NECK SPINE W/O DYE: CPT

## 2023-03-20 RX ORDER — ACETAMINOPHEN 500 MG
1000 TABLET ORAL ONCE
Status: DISCONTINUED | OUTPATIENT
Start: 2023-03-20 | End: 2023-03-20 | Stop reason: HOSPADM

## 2023-03-20 RX ORDER — TRAMADOL HYDROCHLORIDE 50 MG/1
50 TABLET ORAL EVERY 8 HOURS PRN
Qty: 12 TABLET | Refills: 0 | Status: SHIPPED | OUTPATIENT
Start: 2023-03-20
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
## 2023-03-20 NOTE — DISCHARGE INSTRUCTIONS
Return for increasing headache altered mental status vomiting visual changes numbness ting weakness to extremities or any other new or worse problems or concerns return Teresa to the ER.  Ultram sent to your pharmacy.  Follow-up your doctor 1 week.  Activity as tolerated but no excessive heavy activity.

## 2023-04-10 ENCOUNTER — HOSPITAL ENCOUNTER (OUTPATIENT)
Dept: CARDIOLOGY | Facility: HOSPITAL | Age: 63
Discharge: HOME | End: 2023-04-10
Admitting: FAMILY MEDICINE

## 2023-04-10 DIAGNOSIS — Z13.9 SCREENING DUE: ICD-10-CM

## 2023-04-10 LAB
BH CV VAS PRELIMINARY FINDINGS SCRIPTING: 1
BH CV XLRA MEAS - MID AO DIAM: 2.1 CM
BH CV XLRA MEAS - PAD LEFT ABI PT: 1.22
BH CV XLRA MEAS - PAD LEFT ARM: 152 MMHG
BH CV XLRA MEAS - PAD LEFT LEG PT: 186 MMHG
BH CV XLRA MEAS - PAD RIGHT ABI PT: 1.23
BH CV XLRA MEAS - PAD RIGHT LEG PT: 187 MMHG
BH CV XLRA MEAS LEFT DIST CCA EDV: -28 CM/SEC
BH CV XLRA MEAS LEFT DIST CCA PSV: -80.1 CM/SEC
BH CV XLRA MEAS LEFT ICA/CCA RATIO: 2.25
BH CV XLRA MEAS LEFT MID CCA PSV: 80 CM/SEC
BH CV XLRA MEAS LEFT MID ICA PSV: 180 CM/SEC
BH CV XLRA MEAS RIGHT DIST CCA EDV: -19.9 CM/SEC
BH CV XLRA MEAS RIGHT DIST CCA PSV: -53.4 CM/SEC
BH CV XLRA MEAS RIGHT ICA/CCA RATIO: 12.43
BH CV XLRA MEAS RIGHT MID CCA PSV: 53 CM/SEC
BH CV XLRA MEAS RIGHT MID ICA PSV: 659 CM/SEC
BH CV XLRA MEAS RIGHT PROX ICA EDV: -257 CM/SEC
BH CV XLRA MEAS RIGHT PROX ICA PSV: -659 CM/SEC
MAXIMAL PREDICTED HEART RATE: 157 BPM
STRESS TARGET HR: 133 BPM

## 2023-04-10 PROCEDURE — 93799 UNLISTED CV SVC/PROCEDURE: CPT

## 2023-05-27 PROBLEM — M25.532 CHRONIC PAIN OF LEFT WRIST: Status: ACTIVE | Noted: 2023-05-27

## 2023-05-27 PROBLEM — R51.9 HEADACHE: Status: ACTIVE | Noted: 2022-07-21

## 2023-05-27 PROBLEM — F41.1 GENERALIZED ANXIETY DISORDER: Status: ACTIVE | Noted: 2022-07-21

## 2023-05-27 PROBLEM — R76.8 ANA POSITIVE: Status: ACTIVE | Noted: 2023-05-27

## 2023-05-27 PROBLEM — R53.83 FATIGUE: Status: ACTIVE | Noted: 2023-05-27

## 2023-05-27 PROBLEM — N02.9 BENIGN HEMATURIA: Status: ACTIVE | Noted: 2023-05-27

## 2023-05-27 PROBLEM — I88.1 CHRONIC LYMPHADENITIS: Status: ACTIVE | Noted: 2023-05-27

## 2023-05-27 PROBLEM — S39.012A LUMBOSACRAL STRAIN: Status: ACTIVE | Noted: 2023-03-22

## 2023-05-27 PROBLEM — K21.9 GERD (GASTROESOPHAGEAL REFLUX DISEASE): Status: ACTIVE | Noted: 2023-05-27

## 2023-05-27 PROBLEM — A69.20 LYME DISEASE: Status: ACTIVE | Noted: 2023-05-27

## 2023-05-27 PROBLEM — N95.0 POSTMENOPAUSAL BLEEDING: Status: ACTIVE | Noted: 2023-05-27

## 2023-05-27 PROBLEM — G89.29 CHRONIC PAIN OF LEFT WRIST: Status: ACTIVE | Noted: 2023-05-27
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
## 2023-05-27 PROBLEM — M54.2 NECK PAIN: Status: ACTIVE | Noted: 2023-05-27

## 2023-05-27 PROBLEM — K76.6 PORTAL HYPERTENSION: Status: ACTIVE | Noted: 2022-10-28

## 2023-05-27 PROBLEM — K59.00 CONSTIPATION: Status: ACTIVE | Noted: 2022-10-28

## 2023-05-27 PROBLEM — J45.909 ASTHMA: Status: ACTIVE | Noted: 2023-05-27

## 2023-05-27 PROBLEM — D18.00 HEMANGIOMA: Status: ACTIVE | Noted: 2023-05-27

## 2023-05-27 PROBLEM — R79.89 HIGH SERUM FERRITIN: Status: ACTIVE | Noted: 2023-05-27

## 2023-05-27 NOTE — PATIENT INSTRUCTIONS
Health Maintenance Due   Topic Date Due    URINE MICROALBUMIN  Never done    COVID-19 Vaccine (1) Never done    Pneumococcal Vaccine 0-64 (1 - PCV) Never done    TDAP/TD VACCINES (1 - Tdap) Never done    ZOSTER VACCINE (1 of 2) Never done    LUNG CANCER SCREENING  Never done    Hepatitis B (1 of 3 - Risk 3-dose series) Never done    HEPATITIS C SCREENING  Never done    ANNUAL PHYSICAL  Never done    DIABETIC FOOT EXAM  Never done    HEMOGLOBIN A1C  Never done    DIABETIC EYE EXAM  Never done    LIPID PANEL  04/19/2023      You are due for adacel Tdap vaccination. (provides protection against tetanus, diptheria and whooping cough) Please  get the immunization at your local pharmacy at your earliest convenience. This immunization will next be due in 10 years. Please click on the link for more information about this vaccine.    [facility] Tdap Vaccine Information    You are due for Shingrix vaccination series ( the newest shingles vaccine).  It is a two shot series spaced 2-6 months apart. Please get this vaccine series started at your earliest convenience at your local pharmacy to help avoid shingles outbreak. It is more effective than the old Zostavax vaccine and is recommended even if you have had the Zostavax vaccine in the past.  Once the Shingrix series is completed, it does not need to be repeated.   For more information, please look at the website below:  [facility] Shingrix Vaccine Information    Trazodone consideration for sleep    Consider Cognitive Behavioral Therapy

## 2023-05-30 ENCOUNTER — OFFICE VISIT (OUTPATIENT)
Dept: FAMILY MEDICINE CLINIC | Facility: CLINIC | Age: 63
End: 2023-05-30

## 2023-05-30 VITALS
OXYGEN SATURATION: 96 % | WEIGHT: 128 LBS | BODY MASS INDEX: 23.55 KG/M2 | SYSTOLIC BLOOD PRESSURE: 165 MMHG | TEMPERATURE: 98.6 F | HEIGHT: 62 IN | HEART RATE: 84 BPM | DIASTOLIC BLOOD PRESSURE: 74 MMHG

## 2023-05-30 DIAGNOSIS — R53.83 FATIGUE, UNSPECIFIED TYPE: ICD-10-CM

## 2023-05-30 DIAGNOSIS — E78.2 MIXED HYPERLIPIDEMIA: ICD-10-CM

## 2023-05-30 DIAGNOSIS — D89.9 IMMUNE DISORDER: ICD-10-CM

## 2023-05-30 DIAGNOSIS — F17.210 CIGARETTE SMOKER: ICD-10-CM

## 2023-05-30 DIAGNOSIS — D18.00 HEMANGIOMA, UNSPECIFIED SITE: ICD-10-CM

## 2023-05-30 DIAGNOSIS — Z12.2 ENCOUNTER FOR SCREENING FOR LUNG CANCER: ICD-10-CM

## 2023-05-30 DIAGNOSIS — Z11.59 ENCOUNTER FOR HEPATITIS C VIRUS SCREENING TEST FOR HIGH RISK PATIENT: ICD-10-CM

## 2023-05-30 DIAGNOSIS — R79.89 HIGH SERUM FERRITIN: ICD-10-CM

## 2023-05-30 DIAGNOSIS — R79.89 LOW SERUM PARATHYROID HORMONE (PTH): ICD-10-CM

## 2023-05-30 DIAGNOSIS — R76.11 HISTORY OF POSITIVE PPD, TREATMENT STATUS UNKNOWN: ICD-10-CM

## 2023-05-30 DIAGNOSIS — I10 ESSENTIAL HYPERTENSION: ICD-10-CM

## 2023-05-30 DIAGNOSIS — K74.69 OTHER CIRRHOSIS OF LIVER: ICD-10-CM

## 2023-05-30 DIAGNOSIS — Z11.4 SCREENING FOR HIV (HUMAN IMMUNODEFICIENCY VIRUS): ICD-10-CM

## 2023-05-30 DIAGNOSIS — R51.9 NONINTRACTABLE HEADACHE, UNSPECIFIED CHRONICITY PATTERN, UNSPECIFIED HEADACHE TYPE: ICD-10-CM

## 2023-05-30 DIAGNOSIS — R00.2 PALPITATIONS: ICD-10-CM

## 2023-05-30 DIAGNOSIS — K59.00 CONSTIPATION, UNSPECIFIED CONSTIPATION TYPE: ICD-10-CM

## 2023-05-30 DIAGNOSIS — N95.0 POSTMENOPAUSAL BLEEDING: ICD-10-CM

## 2023-05-30 DIAGNOSIS — Z91.89 ENCOUNTER FOR HEPATITIS C VIRUS SCREENING TEST FOR HIGH RISK PATIENT: ICD-10-CM

## 2023-05-30 DIAGNOSIS — K21.9 GASTROESOPHAGEAL REFLUX DISEASE, UNSPECIFIED WHETHER ESOPHAGITIS PRESENT: ICD-10-CM

## 2023-05-30 DIAGNOSIS — M35.00 SJOGREN'S SYNDROME, WITH UNSPECIFIED ORGAN INVOLVEMENT: ICD-10-CM

## 2023-05-30 DIAGNOSIS — E11.9 TYPE 2 DIABETES MELLITUS WITHOUT COMPLICATION, WITHOUT LONG-TERM CURRENT USE OF INSULIN: ICD-10-CM

## 2023-05-30 DIAGNOSIS — Z00.01 ENCOUNTER FOR ANNUAL GENERAL MEDICAL EXAMINATION WITH ABNORMAL FINDINGS IN ADULT: Primary | ICD-10-CM

## 2023-05-30 DIAGNOSIS — E55.9 VITAMIN D DEFICIENCY: ICD-10-CM

## 2023-05-30 DIAGNOSIS — Z85.3 HISTORY OF BREAST CANCER IN FEMALE: ICD-10-CM

## 2023-05-30 PROBLEM — J45.909 ASTHMA: Status: RESOLVED | Noted: 2023-05-27 | Resolved: 2023-05-30

## 2023-05-30 RX ORDER — NICOTINE 21 MG/24HR
1 PATCH, TRANSDERMAL 24 HOURS TRANSDERMAL EVERY 24 HOURS
Qty: 30 PATCH | Refills: 3 | Status: SHIPPED | OUTPATIENT
Start: 2023-05-30

## 2023-05-30 NOTE — PROGRESS NOTES
Subjective   Meeta Daley is a 62 y.o. female presents for   Chief Complaint   Patient presents with   • Establish Care   • Annual Exam     Has 100% blockage on right sided carotid artery and 50% blockage on left sided carotid artery.      Patient has been erroneously marked as diabetic. Based on the available clinical information, she does not have diabetes and should therefore be excluded from diabetic health maintenance and quality measures for the remainder of the reporting period.    Health Maintenance Due   Topic Date Due   • URINE MICROALBUMIN  Never done   • DXA SCAN  Never done   • COVID-19 Vaccine (1) Never done   • Pneumococcal Vaccine 0-64 (1 - PCV) Never done   • TDAP/TD VACCINES (1 - Tdap) Never done   • ZOSTER VACCINE (1 of 2) Never done   • LUNG CANCER SCREENING  Never done   • Hepatitis B (1 of 3 - Risk 3-dose series) Never done   • HEPATITIS C SCREENING  Never done   • ANNUAL PHYSICAL  Never done   • HEMOGLOBIN A1C  Never done   • DIABETIC EYE EXAM  Never done   • LIPID PANEL  04/19/2023       History of Present Illness     Meeta Daley  is here today to establish care and for her annual [employer] wellness exam. She has been advised to wear sunscreen and a seatbelt. She is also here for encounter for lung cancer screening, HIV, hepatitis C screening, cirrhosis of the liver, Sjogren syndrome, essential hypertension, history of breast cancer, vitamin D deficiency, mixed hyperlipidemia, history of positive PPD with treatment status unknown. Cigarette smoker, palpitations, low serum parathyroid hormone. Asthma, unspecified. Constipation, fatigue, GERD, high serum ferritin, non-intractable headache, postmenopausal bleeding, hemangioma, cancer, perhaps breast, not sure, immune disorder, type 2 diabetes without insulin. She has medication allergies to most antibiotics except Z-Pac, Rocephin and Kenalog.    Medication allergies  The patient reports being allergic to most medications including   "levofloxacin, penicillin, sulfa, ACE inhibitors, and nortriptyline. She states she does not have a list, but has a allergy or side effects to most medication. She notes the allergy and side effect issues is due to her cirrhosis of the liver.    Cigarette smoking  The patient reports she still smokes 15-20 daily. She states she would like a prescription for the patches. She notes she does not have asthma.     Sjogren syndrome  The patient reports she is currently not seeing a rheumatologist. She states due to working 7 days a week it is difficult to see a doctor.    Fatigue  The patient reports she feels tired all the time. She states it could be her heart issue. The patient notes seeing a Dr. Cabrera, 2 weeks ago, and was told she has 100% carotid blockage on one side and 50% carotid blockage on the other side. She reports 5/31/2023 she is having a heart CT.    Breast cancer  The patient states she is not seeing a doctor for the breast cancer. She notes her doctors have been ordering the screenings.    Positive TB test  The patient reports she has never been treated for tuberculosis. She states she has always tested positive and was exposed to tuberculosis by her uncle.    Constipation  The patient reports she takes MiraLAX, but it causes her to have gas.    Anxiety  The patient states she has anxiety. She notes that it causes her to get only 3-4 hours of sleep because her mind is racing.      Depression  The patient reports she is depressed. She states she has tried medications in the past.    Vitals:    05/30/23 1404   BP: 165/74   BP Location: Right arm   Patient Position: Sitting   Cuff Size: Adult   Pulse: 84   Temp: 98.6 °F (37 °C)   TempSrc: Tympanic   SpO2: 96%   Weight: 58.1 kg (128 lb)   Height: 157.5 cm (62.01\")     Body mass index is 23.41 kg/m².    Current Outpatient Medications on File Prior to Visit   Medication Sig Dispense Refill   • clobetasol (TEMOVATE) 0.05 % cream APPLY TOPICALLY TO THE AFFECTED " AREA TWICE DAILY AS NEEDED FOR ITCHING     • losartan (COZAAR) 25 MG tablet TAKE 1 TABLET BY MOUTH DAILY 90 tablet 1   • mupirocin (BACTROBAN) 2 % nasal ointment into the nostril(s) as directed by provider 2 (Two) Times a Day.     • traMADol (ULTRAM) 50 MG tablet Take 1 tablet by mouth Every 8 (Eight) Hours As Needed for Moderate Pain. 12 tablet 0   • [DISCONTINUED] aspirin 81 MG EC tablet Take 1 tablet by mouth Daily.     • [DISCONTINUED] fexofenadine (Allegra Allergy) 180 MG tablet Take 1 tablet by mouth Daily. 30 tablet 12   • [DISCONTINUED] rosuvastatin (CRESTOR) 5 MG tablet TAKE 1 TABLET BY MOUTH DAILY 90 tablet 3   • [DISCONTINUED] vitamin D3 (Vitamin D) 125 MCG (5000 UT) capsule capsule Take 1 capsule by mouth Daily. 30 capsule 3     No current facility-administered medications on file prior to visit.       The following portions of the patient's history were reviewed and updated as appropriate: allergies, current medications, past family history, past medical history, past social history, past surgical history and problem list.    Review of Systems   HENT: Negative for hearing loss.    Eyes: Positive for visual disturbance.   Cardiovascular: Negative for chest pain and palpitations.   Gastrointestinal: Positive for constipation. Negative for diarrhea.   Genitourinary: Negative for dysuria and vaginal discharge.   Neurological: Negative for seizures and syncope.       Objective   Physical Exam  HENT:      Right Ear: Tympanic membrane normal.      Left Ear: Tympanic membrane normal.      Mouth/Throat:      Pharynx: Oropharynx is clear.   Eyes:      Pupils: Pupils are equal, round, and reactive to light.   Neck:      Vascular: Carotid bruit present.      Comments: Thyroid is nonpalpable and there are no nodules. No cervical or suboccipital adenopathy.  Mild left carotid bruit. Right carotid bruit.    Cardiovascular:      Rate and Rhythm: Normal rate and regular rhythm.      Pulses:           Dorsalis pedis  pulses are 1+ on the right side and 1+ on the left side.        Posterior tibial pulses are 1+ on the right side and 1+ on the left side.      Heart sounds: Normal heart sounds. No murmur heard.  Pulmonary:      Comments: Breath sounds are equal but markedly decreased in all six lung fields.  Abdominal:      General: Bowel sounds are normal. There is no distension.      Palpations: There is no mass.      Tenderness: There is no abdominal tenderness.      Comments: Small amount of liver enlargement.    Musculoskeletal:      Right lower leg: No edema.      Left lower leg: No edema.   Feet:      Right foot:      Protective Sensation: 10 sites tested. 10 sites sensed.      Skin integrity: Skin integrity normal.      Toenail Condition: Right toenails are abnormally thick.      Left foot:      Protective Sensation: 10 sites tested. 10 sites sensed.      Skin integrity: Skin integrity normal.      Toenail Condition: Left toenails are normal.      Comments: Diabetic Foot Exam Performed and Monofilament Test Performed        PHQ-9 Total Score: 0    Assessment & Plan   Diagnoses and all orders for this visit:    1. Encounter for annual general medical examination with abnormal findings in adult (Primary)    2. Encounter for screening for lung cancer    3. Screening for HIV (human immunodeficiency virus)  -     HIV-1 / O / 2 Ag / Antibody 4th Generation; Future    4. Encounter for hepatitis C virus screening test for high risk patient  -     Hepatitis C Antibody; Future    5. Other cirrhosis of liver  -     Ambulatory Referral to Gastroenterology    6. Sjogren's syndrome, with unspecified organ involvement  -     C-reactive Protein; Future  -     Sedimentation Rate; Future  -     Ambulatory Referral to Rheumatology    7. Essential hypertension  -     CBC Auto Differential; Future  -     Comprehensive Metabolic Panel; Future    8. History of breast cancer in female  -     Ambulatory Referral to Hematology / Oncology    9.  Vitamin D deficiency  -     Vitamin D,25-Hydroxy; Future    10. Mixed hyperlipidemia  -     Lipid Panel; Future    11. History of positive PPD, treatment status unknown    12. Cigarette smoker    13. Palpitations  -     Magnesium; Future  -     TSH; Future    14. Low serum parathyroid hormone (PTH)  -     PTH, Intact; Future    15. Constipation, unspecified constipation type    16. Fatigue, unspecified type  -     Vitamin B12; Future  -     Ambulatory Referral to Cardiology    17. Gastroesophageal reflux disease, unspecified whether esophagitis present  -     Magnesium; Future    18. High serum ferritin  -     Iron Profile; Future  -     Ferritin; Future    19. Nonintractable headache, unspecified chronicity pattern, unspecified headache type    20. Postmenopausal bleeding  -     Ambulatory Referral to Gynecology    21. Hemangioma, unspecified site    22. Immune disorder    23. Type 2 diabetes mellitus without complication, without long-term current use of insulin  -     Cancel: Microalbumin / Creatinine Urine Ratio - Urine, Clean Catch  -     Cancel: Ambulatory Referral for Diabetic Eye Exam-Ophthalmology    Other orders  -     nicotine (Nicoderm CQ) 21 MG/24HR patch; Place 1 patch on the skin as directed by provider Daily.  Dispense: 30 patch; Refill: 3        Patient Instructions     Health Maintenance Due   Topic Date Due   • URINE MICROALBUMIN  Never done   • COVID-19 Vaccine (1) Never done   • Pneumococcal Vaccine 0-64 (1 - PCV) Never done   • TDAP/TD VACCINES (1 - Tdap) Never done   • ZOSTER VACCINE (1 of 2) Never done   • LUNG CANCER SCREENING  Never done   • Hepatitis B (1 of 3 - Risk 3-dose series) Never done   • HEPATITIS C SCREENING  Never done   • ANNUAL PHYSICAL  Never done   • DIABETIC FOOT EXAM  Never done   • HEMOGLOBIN A1C  Never done   • DIABETIC EYE EXAM  Never done   • LIPID PANEL  04/19/2023      You are due for adacel Tdap vaccination. (provides protection against tetanus, diptheria and  whooping cough) Please  get the immunization at your local pharmacy at your earliest convenience. This immunization will next be due in 10 years. Please click on the link for more information about this vaccine.    [facility] Tdap Vaccine Information    You are due for Shingrix vaccination series ( the newest shingles vaccine).  It is a two shot series spaced 2-6 months apart. Please get this vaccine series started at your earliest convenience at your local pharmacy to help avoid shingles outbreak. It is more effective than the old Zostavax vaccine and is recommended even if you have had the Zostavax vaccine in the past.  Once the Shingrix series is completed, it does not need to be repeated.   For more information, please look at the website below:  [facility] Shingrix Vaccine Information    Trazodone consideration for sleep    Consider Cognitive Behavioral Therapy      Transcribed from ambient dictation for Alexia Plascencia MD by Cathy Lockett.  05/30/23   16:59 EDT    Patient or patient representative verbalized consent to the visit recording.  I have personally performed the services described in this document as transcribed by the above individual, and it is both accurate and complete.

## 2023-05-31 ENCOUNTER — HOSPITAL ENCOUNTER (OUTPATIENT)
Dept: CT IMAGING | Facility: HOSPITAL | Age: 63
Discharge: HOME | End: 2023-05-31
Admitting: FAMILY MEDICINE

## 2023-05-31 DIAGNOSIS — Z13.9 SCREENING DUE: ICD-10-CM

## 2023-05-31 PROCEDURE — 75571 CT HRT W/O DYE W/CA TEST: CPT

## 2023-06-05 NOTE — PROGRESS NOTES
Date of Office Visit: 2023  Encounter Provider: Dr. Albert Salinas  Place of Service: [facility] CARDIOLOGY [place]  Patient Name: Meeta Daley  :1960  Alexia Plascencia MD    Chief Complaint   Patient presents with    Hypertension    Palpitations    Hyperlipidemia    Fatigue    Consult     History of Present Illness:    I am pleased to see Mrs. Villasenor in my office today as a new consultation.    As you know, patient is 62 years old white female whose past medical history significant for anxiety disorder, history of borderline hypertension, who is referred to me for symptom of chest discomfort and shortness of breath.    Patient reports that from last few months she is noticing symptom of right-sided chest discomfort.  Patient was also noticing headaches and heaviness of the head.  She underwent carotid Doppler and was noted to have 100% occlusion of right ICA and moderate stenosis of left ICA.  Patient is seen vascular surgeon Dr. Mora at [facility].  Patient is being followed by him.  Patient is referred to me for symptom of chest discomfort.  Patient is short of breath on exertion.  Patient denies any orthopnea, PND, syncope or presyncope.    Patient does not have previous history of CAD, PCI or MI.  Patient does not abuse alcohol.  She smokes half a.  Pack of cigarettes per day.    EKG showed normal sinus rhythm no previous EKG to compare    Patient has significant risk factor I will recommend to proceed with stress test with Card[facility] imaging.  Her blood pressure is elevated I advised her to monitor the blood pressure.  Patient has anxiety disorder and she reports she cannot tolerate medicines well.  She is very aversive to any medication taking.  She has stopped previously multiple antihypertensive regimen because of vague side effects..  I had lengthy discussion with the patient that if she is unable to tolerate take any medicines these risk factor modification would  not be possible.  Education is given to her.        Past Medical History:   Diagnosis Date    Allergic     Anxiety     Cancer     Colon polyp     COPD (chronic obstructive pulmonary disease) 2023    Early signs    Diverticulosis     Headache     Hemangioma 05/27/2023    Hyperlipidemia     Hypertension 2021    Immune disorder     Irritable bowel syndrome     Liver disease 2018    Osteopenia 2006    Portal hypertension 10/28/2022    Urinary tract infection 2 months ago    Still having problrmd    Visual impairment 2020    Right eye keep losing vision         Past Surgical History:   Procedure Laterality Date    BREAST SURGERY      CHOLECYSTECTOMY      COLONOSCOPY  5 years ago and last year    HYSTERECTOMY      LYMPH NODE BIOPSY      SUBTOTAL HYSTERECTOMY  30 years ago ?    Total hysterectomy    TUBAL ABDOMINAL LIGATION             Current Outpatient Medications:     clobetasol (TEMOVATE) 0.05 % cream, APPLY TOPICALLY TO THE AFFECTED AREA TWICE DAILY AS NEEDED FOR ITCHING, Disp: , Rfl:     mupirocin (BACTROBAN) 2 % nasal ointment, into the nostril(s) as directed by provider 2 (Two) Times a Day., Disp: , Rfl:       Social History     Socioeconomic History    Marital status:    Tobacco Use    Smoking status: Every Day     Packs/day: 1.00     Years: 43.00     Pack years: 43.00     Types: Cigarettes     Start date: 1/1/1979    Smokeless tobacco: Never    Tobacco comments:     Patient was advised to quit smoking   Vaping Use    Vaping Use: Never used   Substance and Sexual Activity    Alcohol use: Never     Comment: once a year    Drug use: Never    Sexual activity: Yes     Partners: Male     Birth control/protection: Hysterectomy         Review of Systems   Constitutional: Negative for chills and fever.   HENT:  Negative for ear discharge and nosebleeds.    Eyes:  Negative for discharge and redness.   Cardiovascular:  Positive for chest pain. Negative for orthopnea, palpitations, paroxysmal nocturnal dyspnea and  "syncope.   Respiratory:  Positive for shortness of breath. Negative for cough and wheezing.    Endocrine: Negative for heat intolerance.   Skin:  Negative for rash.   Musculoskeletal:  Positive for arthritis. Negative for myalgias.   Gastrointestinal:  Negative for abdominal pain, melena, nausea and vomiting.   Genitourinary:  Negative for dysuria and hematuria.   Neurological:  Negative for dizziness, light-headedness, numbness and tremors.   Psychiatric/Behavioral:  Negative for depression. The patient is nervous/anxious.      Procedures      ECG 12 Lead    Date/Time: 6/7/2023 9:32 AM  Performed by: Albert Salinas MD  Authorized by: Albert Salinas MD   Previous ECG: no previous ECG available  Rhythm: sinus rhythm    Clinical impression: normal ECG        ECG 12 Lead    (Results Pending)           Objective:    /75 (BP Location: Right arm, Patient Position: Sitting, Cuff Size: Adult)   Pulse 81   Ht 157.5 cm (62.01\")   Wt 58.1 kg (128 lb)   SpO2 96%   BMI 23.41 kg/m²         Constitutional:       Appearance: Well-developed.   Eyes:      General: No scleral icterus.        Right eye: No discharge.   HENT:      Head: Normocephalic and atraumatic.   Neck:      Thyroid: No thyromegaly.      Lymphadenopathy: No cervical adenopathy.   Pulmonary:      Effort: Pulmonary effort is normal. No respiratory distress.      Breath sounds: Normal breath sounds. No wheezing. No rales.   Cardiovascular:      Normal rate. Regular rhythm.      No gallop.    Edema:     Peripheral edema absent.   Abdominal:      Tenderness: There is no abdominal tenderness.   Skin:     Findings: No erythema or rash.   Neurological:      Mental Status: Alert and oriented to person, place, and time.           Assessment:       Diagnosis Plan   1. Fatigue, unspecified type  ECG 12 Lead    Stress Test With Myocardial Perfusion One Day      2. Essential hypertension  ECG 12 Lead    Stress Test With Myocardial Perfusion One Day      3. Mixed " hyperlipidemia  ECG 12 Lead    Stress Test With Myocardial Perfusion One Day      4. Palpitations  ECG 12 Lead    Stress Test With Myocardial Perfusion One Day      5. Chest pain, precordial  Stress Test With Myocardial Perfusion One Day               Plan:       MDM:    1.  Chest discomfort:    Patient is having symptom of chest pain.  I would recommend to proceed with stress test with Cardiolite imaging    2.  Hypertension:    Blood pressure is elevated but she believes it is whitecoat hypertension but I advised her to monitor the blood pressure and bring the logbook.  Patient previously could not tolerate multiple medicines in the past.    3.  Hyperlipidemia:    Patient is advised to proceed with statin therapy if it is needed I will leave the decision to PCP    4.  Shortness of breath:    Patient had echocardiogram in 2022 which was unremarkable.

## 2023-06-07 ENCOUNTER — TELEPHONE (OUTPATIENT)
Dept: ONCOLOGY | Facility: CLINIC | Age: 63
End: 2023-06-07
Payer: COMMERCIAL

## 2023-06-07 ENCOUNTER — OFFICE VISIT (OUTPATIENT)
Dept: CARDIOLOGY | Facility: CLINIC | Age: 63
End: 2023-06-07
Payer: COMMERCIAL

## 2023-06-07 VITALS
WEIGHT: 128 LBS | HEART RATE: 81 BPM | DIASTOLIC BLOOD PRESSURE: 75 MMHG | BODY MASS INDEX: 23.55 KG/M2 | HEIGHT: 62 IN | OXYGEN SATURATION: 96 % | SYSTOLIC BLOOD PRESSURE: 167 MMHG

## 2023-06-07 DIAGNOSIS — I10 ESSENTIAL HYPERTENSION: ICD-10-CM

## 2023-06-07 DIAGNOSIS — E78.2 MIXED HYPERLIPIDEMIA: ICD-10-CM

## 2023-06-07 DIAGNOSIS — R07.2 CHEST PAIN, PRECORDIAL: ICD-10-CM

## 2023-06-07 DIAGNOSIS — R00.2 PALPITATIONS: ICD-10-CM

## 2023-06-07 DIAGNOSIS — R53.83 FATIGUE, UNSPECIFIED TYPE: Primary | ICD-10-CM

## 2023-06-07 PROCEDURE — 99204 OFFICE O/P NEW MOD 45 MIN: CPT | Performed by: INTERNAL MEDICINE

## 2023-06-07 PROCEDURE — 93000 ELECTROCARDIOGRAM COMPLETE: CPT | Performed by: INTERNAL MEDICINE

## 2023-06-07 NOTE — TELEPHONE ENCOUNTER
Pt will call back to [facility] when she is not driving to make appt from [facility]

## 2023-06-09 ENCOUNTER — PATIENT ROUNDING (BHMG ONLY) (OUTPATIENT)
Dept: FAMILY MEDICINE CLINIC | Facility: CLINIC | Age: 63
End: 2023-06-09
Payer: COMMERCIAL

## 2023-06-09 NOTE — PROGRESS NOTES
A [employer] message has been sent to the patient for patient [place] [facility]

## 2023-07-03 ENCOUNTER — TELEPHONE (OUTPATIENT)
Dept: CARDIOLOGY | Facility: CLINIC | Age: 63
End: 2023-07-03

## 2023-07-03 NOTE — TELEPHONE ENCOUNTER
Spoke with the patient she has been requested that Dr. Salinas refer her to a new vascular surgeon.

## 2023-07-03 NOTE — TELEPHONE ENCOUNTER
"    Caller: Meeta Daley \"Andreina\"    Relationship: Self    Best call back number: [phone]    Caller requesting test results: SELF    What test was performed: STRESS TEST    When was the test performed: 06.28.23    Where was the test performed: [facility]    Additional notes: PT RECEIVED HER TEST RESULTS BACK ON [facility] BUT SHE DOES NOT UNDERSTAND WHAT THEY MEAN. SHE WOULD LIKE SOMEONE TO CALL HER BACK [facility] TO TALK TO HER ABOUT IT. SHE IS VERY CONCERNED AND STILL HAVING SYMPTOMS. PLEASE REACH OUT TO PT.          "

## 2023-07-26 ENCOUNTER — OFFICE (AMBULATORY)
Dept: URBAN - METROPOLITAN AREA CLINIC 64 | Facility: CLINIC | Age: 63
End: 2023-07-26

## 2023-07-26 VITALS
WEIGHT: 129 LBS | HEART RATE: 77 BPM | SYSTOLIC BLOOD PRESSURE: 152 MMHG | DIASTOLIC BLOOD PRESSURE: 82 MMHG | HEIGHT: 62 IN

## 2023-07-26 DIAGNOSIS — R74.8 ABNORMAL LEVELS OF OTHER SERUM ENZYMES: ICD-10-CM

## 2023-07-26 DIAGNOSIS — K74.02 HEPATIC FIBROSIS, ADVANCED FIBROSIS: ICD-10-CM

## 2023-07-26 DIAGNOSIS — K76.0 FATTY (CHANGE OF) LIVER, NOT ELSEWHERE CLASSIFIED: ICD-10-CM

## 2023-07-26 PROCEDURE — 99214 OFFICE O/P EST MOD 30 MIN: CPT

## 2023-08-10 ENCOUNTER — TRANSCRIBE ORDERS (OUTPATIENT)
Dept: ADMINISTRATIVE | Facility: HOSPITAL | Age: 63
End: 2023-08-10
Payer: COMMERCIAL

## 2023-08-10 ENCOUNTER — APPOINTMENT (OUTPATIENT)
Dept: VASCULAR SURGERY | Facility: HOSPITAL | Age: 63
End: 2023-08-10
Payer: COMMERCIAL

## 2023-08-10 DIAGNOSIS — R09.89 BRUIT: Primary | ICD-10-CM

## 2023-08-10 PROCEDURE — G0463 HOSPITAL OUTPT CLINIC VISIT: HCPCS

## 2023-08-31 RX ORDER — MONTELUKAST SODIUM 10 MG/1
1 TABLET ORAL DAILY
COMMUNITY
End: 2023-09-01

## 2023-08-31 RX ORDER — PROPRANOLOL HYDROCHLORIDE 10 MG/1
1 TABLET ORAL 2 TIMES DAILY PRN
COMMUNITY
End: 2023-09-01

## 2023-08-31 RX ORDER — ALPRAZOLAM 0.25 MG/1
1 TABLET ORAL DAILY PRN
COMMUNITY

## 2023-08-31 RX ORDER — AMLODIPINE BESYLATE 5 MG/1
1 TABLET ORAL DAILY
COMMUNITY
End: 2023-09-01

## 2023-08-31 RX ORDER — TRAMADOL HYDROCHLORIDE 50 MG/1
TABLET ORAL
COMMUNITY
End: 2023-09-01

## 2023-08-31 RX ORDER — NICOTINE 21 MG/24HR
PATCH, TRANSDERMAL 24 HOURS TRANSDERMAL
COMMUNITY
End: 2023-09-01

## 2023-08-31 RX ORDER — METHOCARBAMOL 500 MG/1
1 TABLET, FILM COATED ORAL EVERY 8 HOURS
COMMUNITY
End: 2023-09-01

## 2023-08-31 NOTE — PROGRESS NOTES
Date of Office Visit: 2023  Encounter Provider: Dr. Albert Salinas  Place of Service: [facility] CARDIOLOGY [place]  Patient Name: Meeta Daley  :1960  Alexia Plascencia MD    Chief Complaint   Patient presents with    Palpitations    Follow-up    Hypertension    Hyperlipidemia     History of Present Illness    I am pleased to see Mrs. Villasenor in my office today as a follow    As you know, patient is 63 years old white female whose past medical history significant for anxiety disorder, history of borderline hypertension, who came today for follow-up    Patient reports that from last few months she is noticing symptom of right-sided chest discomfort.  Patient was also noticing headaches and heaviness of the head.  She underwent carotid Doppler and was noted to have 100% occlusion of right ICA and moderate stenosis of left ICA.  Patient is seen vascular surgeon Dr. Mora at [facility].  Patient is being followed by him.  Patient is referred to me for symptom of chest discomfort.  Patient is short of breath on exertion.  Patient denies any orthopnea, PND, syncope or presyncope.    Patient does not have previous history of CAD, PCI or MI.  Patient does not abuse alcohol.  She smokes half a.  Pack of cigarettes per day.    In 2023, patient underwent stress test which was negative for ischemia or infarction.  Left ventricular function was preserved.    Patient brought the blood pressure logbook and most of the blood pressures are on the higher side.  Patient is reluctant to start any antihypertensive regimen.  She has side effects from other medicine in the past.  However most of them are truly side effects and patient basically have intolerability and does not want to use medication.  She is .  Even today she was reluctant to start any therapy.  I gave her choice and finally she agreed to try Bystolic.    Patient denies any chest pain or tightness or heaviness no  orthopnea PND no syncope or presyncope.  No leg edema noted.    At this stage I will recommend to start Bystolic 2.5 mg daily.  Patient is advised to monitor the blood pressure.          Past Medical History:   Diagnosis Date    Allergic     Anxiety     Cancer     Colon polyp     COPD (chronic obstructive pulmonary disease) 2023    Early signs    Diverticulosis     Headache     Hemangioma 05/27/2023    Hyperlipidemia     Hypertension 2021    Immune disorder     Irritable bowel syndrome     Liver disease 2018    Osteopenia 2006    Portal hypertension 10/28/2022    Urinary tract infection 2 months ago    Still having problrmd    Visual impairment 2020    Right eye keep losing vision         Past Surgical History:   Procedure Laterality Date    BREAST SURGERY      CHOLECYSTECTOMY      COLONOSCOPY  5 years ago and last year    HYSTERECTOMY      LYMPH NODE BIOPSY      SUBTOTAL HYSTERECTOMY  30 years ago ?    Total hysterectomy    TUBAL ABDOMINAL LIGATION             Current Outpatient Medications:     ALPRAZolam (XANAX) 0.25 MG tablet, Take 1 tablet by mouth Daily As Needed., Disp: , Rfl:     cholecalciferol (VITAMIN D3) 1.25 MG (66569 UT) capsule, TAKE 1 CAPSULE BY MOUTH WEEKLY FOR 8 WEEKS THEN 1 EVERY OTHER WEEK, Disp: , Rfl:     mupirocin (BACTROBAN) 2 % nasal ointment, into the nostril(s) as directed by provider 2 (Two) Times a Day., Disp: , Rfl:     vitamin E 100 UNIT capsule, Take 1 capsule by mouth Daily., Disp: , Rfl:     nebivolol (Bystolic) 2.5 MG tablet, Take 1 tablet by mouth Daily., Disp: 90 tablet, Rfl: 0      Social History     Socioeconomic History    Marital status:    Tobacco Use    Smoking status: Every Day     Packs/day: 1.00     Years: 43.00     Pack years: 43.00     Types: Cigarettes     Start date: 1/1/1979    Smokeless tobacco: Never    Tobacco comments:     Patient was advised to quit smoking.  Patient is advised to quit smoking   Vaping Use    Vaping Use: Never used   Substance and Sexual  "Activity    Alcohol use: Never     Comment: once a year    Drug use: Never    Sexual activity: Yes     Partners: Male     Birth control/protection: Hysterectomy         Review of Systems   Constitutional: Negative for chills and fever.   HENT:  Negative for ear discharge and nosebleeds.    Eyes:  Negative for discharge and redness.   Cardiovascular:  Negative for chest pain, orthopnea, palpitations, paroxysmal nocturnal dyspnea and syncope.   Respiratory:  Positive for shortness of breath. Negative for cough and wheezing.    Endocrine: Negative for heat intolerance.   Skin:  Negative for rash.   Musculoskeletal:  Negative for arthritis and myalgias.   Gastrointestinal:  Negative for abdominal pain, melena, nausea and vomiting.   Genitourinary:  Negative for dysuria and hematuria.   Neurological:  Negative for dizziness, light-headedness, numbness and tremors.   Psychiatric/Behavioral:  Negative for depression. The patient is not nervous/anxious.      Procedures    Procedures    No orders to display           Objective:    /83 (BP Location: Right arm, Patient Position: Sitting, Cuff Size: Large Adult)   Pulse 86   Ht 157.5 cm (62.01\")   Wt 58.1 kg (128 lb)   SpO2 96%   BMI 23.41 kg/mý         Constitutional:       Appearance: Well-developed.   Eyes:      General: No scleral icterus.        Right eye: No discharge.   HENT:      Head: Normocephalic and atraumatic.   Neck:      Thyroid: No thyromegaly.      Lymphadenopathy: No cervical adenopathy.   Pulmonary:      Effort: Pulmonary effort is normal. No respiratory distress.      Breath sounds: Normal breath sounds. No wheezing. No rales.   Cardiovascular:      Normal rate. Regular rhythm.      No gallop.    Edema:     Peripheral edema absent.   Abdominal:      Tenderness: There is no abdominal tenderness.   Skin:     Findings: No erythema or rash.   Neurological:      Mental Status: Alert and oriented to person, place, and time.           Assessment:       " Diagnosis Plan   1. Essential hypertension  nebivolol (Bystolic) 2.5 MG tablet      2. Mixed hyperlipidemia  nebivolol (Bystolic) 2.5 MG tablet      3. Palpitations  nebivolol (Bystolic) 2.5 MG tablet               Plan:       MDM:    1.  Hypertension:    I will start Bystolic 2.5 mg daily    2.  Palpitation:    Bystolic would help in control of palpitation    3.  Hyperlipidemia:    Recent LDL is 107.  Patient does not want any statin therapy.  Continue to observe

## 2023-09-01 ENCOUNTER — OFFICE VISIT (OUTPATIENT)
Dept: CARDIOLOGY | Facility: CLINIC | Age: 63
End: 2023-09-01
Payer: COMMERCIAL

## 2023-09-01 VITALS
WEIGHT: 128 LBS | DIASTOLIC BLOOD PRESSURE: 83 MMHG | OXYGEN SATURATION: 96 % | HEIGHT: 62 IN | SYSTOLIC BLOOD PRESSURE: 145 MMHG | BODY MASS INDEX: 23.55 KG/M2 | HEART RATE: 86 BPM

## 2023-09-01 DIAGNOSIS — E78.2 MIXED HYPERLIPIDEMIA: ICD-10-CM

## 2023-09-01 DIAGNOSIS — I10 ESSENTIAL HYPERTENSION: Primary | ICD-10-CM

## 2023-09-01 DIAGNOSIS — R00.2 PALPITATIONS: ICD-10-CM

## 2023-09-01 PROCEDURE — 99214 OFFICE O/P EST MOD 30 MIN: CPT | Performed by: INTERNAL MEDICINE

## 2023-09-01 RX ORDER — NEBIVOLOL 2.5 MG/1
2.5 TABLET ORAL DAILY
Qty: 90 TABLET | Refills: 0 | Status: SHIPPED | OUTPATIENT
Start: 2023-09-01

## 2023-11-03 ENCOUNTER — OFFICE (AMBULATORY)
Dept: URBAN - METROPOLITAN AREA CLINIC 64 | Facility: CLINIC | Age: 63
End: 2023-11-03

## 2023-11-03 VITALS
HEART RATE: 76 BPM | SYSTOLIC BLOOD PRESSURE: 157 MMHG | HEIGHT: 62 IN | DIASTOLIC BLOOD PRESSURE: 85 MMHG | WEIGHT: 131.6 LBS

## 2023-11-03 DIAGNOSIS — K76.0 FATTY (CHANGE OF) LIVER, NOT ELSEWHERE CLASSIFIED: ICD-10-CM

## 2023-11-03 DIAGNOSIS — K59.00 CONSTIPATION, UNSPECIFIED: ICD-10-CM

## 2023-11-03 PROCEDURE — 99214 OFFICE O/P EST MOD 30 MIN: CPT | Performed by: INTERNAL MEDICINE

## 2024-02-29 ENCOUNTER — APPOINTMENT (OUTPATIENT)
Dept: VASCULAR SURGERY | Facility: HOSPITAL | Age: 64
End: 2024-02-29
Payer: COMMERCIAL

## 2024-02-29 ENCOUNTER — HOSPITAL ENCOUNTER (OUTPATIENT)
Dept: CARDIOLOGY | Facility: HOSPITAL | Age: 64
Discharge: HOME | End: 2024-02-29
Payer: COMMERCIAL

## 2024-02-29 DIAGNOSIS — R09.89 BRUIT: ICD-10-CM

## 2024-02-29 LAB
BH CV XLRA MEAS LEFT DIST CCA EDV: 24.9 CM/SEC
BH CV XLRA MEAS LEFT DIST CCA PSV: 100 CM/SEC
BH CV XLRA MEAS LEFT DIST ICA EDV: -38.5 CM/SEC
BH CV XLRA MEAS LEFT DIST ICA PSV: -102 CM/SEC
BH CV XLRA MEAS LEFT ICA/CCA RATIO: -1.82
BH CV XLRA MEAS LEFT PROX CCA EDV: 20.5 CM/SEC
BH CV XLRA MEAS LEFT PROX CCA PSV: 105 CM/SEC
BH CV XLRA MEAS LEFT PROX ECA PSV: -109 CM/SEC
BH CV XLRA MEAS LEFT PROX ICA EDV: -50.5 CM/SEC
BH CV XLRA MEAS LEFT PROX ICA PSV: -191 CM/SEC
BH CV XLRA MEAS LEFT PROX SCLA PSV: 235 CM/SEC
BH CV XLRA MEAS LEFT VERTEBRAL A EDV: 14.9 CM/SEC
BH CV XLRA MEAS LEFT VERTEBRAL A PSV: 38.2 CM/SEC
BH CV XLRA MEAS RIGHT DIST CCA EDV: 8.8 CM/SEC
BH CV XLRA MEAS RIGHT DIST CCA PSV: 38.9 CM/SEC
BH CV XLRA MEAS RIGHT PROX CCA EDV: 11.8 CM/SEC
BH CV XLRA MEAS RIGHT PROX CCA PSV: 119 CM/SEC
BH CV XLRA MEAS RIGHT PROX ECA PSV: -132 CM/SEC
BH CV XLRA MEAS RIGHT PROX SCLA PSV: 179 CM/SEC
BH CV XLRA MEAS RIGHT VERTEBRAL A EDV: 12.6 CM/SEC
BH CV XLRA MEAS RIGHT VERTEBRAL A PSV: 58.7 CM/SEC
LEFT ARM BP: NORMAL MMHG

## 2024-02-29 PROCEDURE — 93880 EXTRACRANIAL BILAT STUDY: CPT

## 2024-02-29 PROCEDURE — G0463 HOSPITAL OUTPT CLINIC VISIT: HCPCS

## 2024-09-10 DIAGNOSIS — I65.23 BILATERAL CAROTID ARTERY OCCLUSION: Primary | ICD-10-CM

## 2024-11-18 ENCOUNTER — TRANSCRIBE ORDERS (OUTPATIENT)
Dept: CARDIAC REHAB | Facility: HOSPITAL | Age: 64
End: 2024-11-18
Payer: COMMERCIAL

## 2024-11-18 DIAGNOSIS — J43.9 PULMONARY EMPHYSEMA, UNSPECIFIED EMPHYSEMA TYPE: Primary | ICD-10-CM

## 2025-05-06 NOTE — TELEPHONE ENCOUNTER
----- Message from Meeta Daley sent at 5/5/2022  8:35 AM EDT -----  Regarding: Blood pressure   Ths morning i woke up my blood pressure was 165/99 took pill at 7:00.  at 7:30 it was 139/99 i took another pill , it seems its worse in the mornings is there anything we can change to help in the mornings , my blood pressure at 8:30 is 159/97   
Sent mychart  
Yes, obviously the lisinopril 10 mg is not strong enough.  Need to increase to 20 mg daily.  She can take two 10 mg tablets at the same time or she can split it and take 10 mg twice daily.  Have her write down all blood pressure checks and bring them to appointment next week to determine if further medication adjustments would be recommended.  
Detail Level: Simple
Detail Level: Detailed